# Patient Record
Sex: FEMALE | Race: BLACK OR AFRICAN AMERICAN | Employment: FULL TIME | URBAN - METROPOLITAN AREA
[De-identification: names, ages, dates, MRNs, and addresses within clinical notes are randomized per-mention and may not be internally consistent; named-entity substitution may affect disease eponyms.]

---

## 2017-01-16 DIAGNOSIS — E55.9 VITAMIN D DEFICIENCY: Primary | ICD-10-CM

## 2017-01-16 RX ORDER — ERGOCALCIFEROL 1.25 MG/1
50000 CAPSULE ORAL WEEKLY
Qty: 8 CAPSULE | Refills: 0 | Status: SHIPPED | OUTPATIENT
Start: 2017-01-16 | End: 2017-02-15

## 2017-01-31 PROBLEM — F41.1 ANXIETY STATE: Status: ACTIVE | Noted: 2017-01-31

## 2017-01-31 PROBLEM — F32.A DEPRESSION: Status: ACTIVE | Noted: 2017-01-31

## 2017-04-14 RX ORDER — ALBUTEROL SULFATE 90 UG/1
1 AEROSOL, METERED RESPIRATORY (INHALATION) EVERY 6 HOURS PRN
Qty: 1 INHALER | Refills: 0 | Status: SHIPPED | OUTPATIENT
Start: 2017-04-14 | End: 2017-06-03

## 2017-04-14 NOTE — TELEPHONE ENCOUNTER
Luis Brown needs a refill of:    •  Albuterol Sulfate HFA (PROAIR HFA) 108 (90 BASE) MCG/ACT Inhalation Aero Soln, Inhale  into the lungs. , Disp: , Rfl:

## 2017-04-14 NOTE — TELEPHONE ENCOUNTER
Requesting Albuterol inhaler refill. Reviewed request with pt. Pt states she has had to use her inhaler a few times a week since the weather change, uses it mostly in the morning.  States she is not in any respiratory distress at this time but would like

## 2017-06-04 NOTE — TELEPHONE ENCOUNTER
Refilled per protocol  Refill Protocol Appointment Criteria  · Appointment scheduled in the past 6 months or in the next 3 months  Recent Visits       Provider Department Primary Dx    5 months ago Patricia Dumont, [facility]

## 2017-06-16 ENCOUNTER — TELEPHONE (OUTPATIENT)
Dept: FAMILY MEDICINE CLINIC | Facility: CLINIC | Age: 37
End: 2017-06-16

## 2017-06-16 NOTE — TELEPHONE ENCOUNTER
Actions Requested:  Pt called office and states \"I think I have vertigo, and been feeling dizzy for about a month, but getting worse\". Describes dizziness as intermittent, sometimes she fells off balance and at times like she is going to faint.   She has Heart beating < 50 beats per minute OR > 140 beats per minute  * Sounds like a life-threatening emergency to the triager  Patient provided with clinic contact information, hours of operation and will call back if needed.  Patient was able to restate instruc

## 2017-06-19 NOTE — TELEPHONE ENCOUNTER
Appt scheduled Saturday 6/24/17 as requested. Advised pt to call back or proceed to UC if symptoms worsen prior to appt, pt agreed with advice given.

## 2017-06-19 NOTE — TELEPHONE ENCOUNTER
Pt states she did not go to UC on Friday as her symptom improved. Pt not able to schedule appt, asked if she could schedule appt with Dr. Nicole Cruz on Saturday, please advise.

## 2017-06-24 ENCOUNTER — APPOINTMENT (OUTPATIENT)
Dept: LAB | Age: 37
End: 2017-06-24
Attending: FAMILY MEDICINE
Payer: COMMERCIAL

## 2017-06-24 ENCOUNTER — OFFICE VISIT (OUTPATIENT)
Dept: FAMILY MEDICINE CLINIC | Facility: CLINIC | Age: 37
End: 2017-06-24

## 2017-06-24 VITALS
TEMPERATURE: 97 F | SYSTOLIC BLOOD PRESSURE: 111 MMHG | DIASTOLIC BLOOD PRESSURE: 74 MMHG | WEIGHT: 194 LBS | BODY MASS INDEX: 33 KG/M2 | HEART RATE: 93 BPM

## 2017-06-24 DIAGNOSIS — E55.9 VITAMIN D DEFICIENCY: ICD-10-CM

## 2017-06-24 DIAGNOSIS — R42 DIZZY SPELLS: ICD-10-CM

## 2017-06-24 DIAGNOSIS — R42 DIZZY SPELLS: Primary | ICD-10-CM

## 2017-06-24 PROCEDURE — 82306 VITAMIN D 25 HYDROXY: CPT

## 2017-06-24 PROCEDURE — 93005 ELECTROCARDIOGRAM TRACING: CPT | Performed by: FAMILY MEDICINE

## 2017-06-24 PROCEDURE — 99214 OFFICE O/P EST MOD 30 MIN: CPT | Performed by: FAMILY MEDICINE

## 2017-06-24 PROCEDURE — 93010 ELECTROCARDIOGRAM REPORT: CPT | Performed by: FAMILY MEDICINE

## 2017-06-24 PROCEDURE — 99212 OFFICE O/P EST SF 10 MIN: CPT | Performed by: FAMILY MEDICINE

## 2017-06-24 PROCEDURE — 36415 COLL VENOUS BLD VENIPUNCTURE: CPT

## 2017-06-24 PROCEDURE — 80053 COMPREHEN METABOLIC PANEL: CPT

## 2017-06-24 PROCEDURE — 84443 ASSAY THYROID STIM HORMONE: CPT

## 2017-06-24 PROCEDURE — 85027 COMPLETE CBC AUTOMATED: CPT

## 2017-06-24 NOTE — PROGRESS NOTES
HPI: Tete Lazaro is a 39year old female who presents for \"dizzy spells\" over the past month. Pt reports it happens randomly. She will be standing and having a conversation and then feels \"out of focus\". States she gets blurry vision.   Lasts a few second reflexes. Normal gait. Normal heel to toe. Negative rhomberg. No pronator drift        Assessment:/Plan:  Dizzy spells  (primary encounter diagnosis)    Normal EKG. Will check baseline labwork.   Encouraged pt to increase fluids as she does not drink that

## 2018-01-23 ENCOUNTER — TELEPHONE (OUTPATIENT)
Dept: OTHER | Age: 38
End: 2018-01-23

## 2018-01-23 RX ORDER — OSELTAMIVIR PHOSPHATE 75 MG/1
75 CAPSULE ORAL 2 TIMES DAILY
Qty: 10 CAPSULE | Refills: 0 | Status: SHIPPED | OUTPATIENT
Start: 2018-01-23 | End: 2018-01-28

## 2018-01-23 NOTE — TELEPHONE ENCOUNTER
Pt states her children were seen in office yesterday and placed on Tamiflu. Pt was advised to call back if she started having symptoms and doctor would send prescription for her. Pt states she has a lingering headache, achiness, and cough.  No fever, no jamari

## 2018-09-06 ENCOUNTER — OFFICE VISIT (OUTPATIENT)
Dept: FAMILY MEDICINE CLINIC | Facility: CLINIC | Age: 38
End: 2018-09-06
Payer: COMMERCIAL

## 2018-09-06 VITALS
HEART RATE: 85 BPM | RESPIRATION RATE: 16 BRPM | TEMPERATURE: 98 F | DIASTOLIC BLOOD PRESSURE: 75 MMHG | SYSTOLIC BLOOD PRESSURE: 113 MMHG | WEIGHT: 193 LBS | BODY MASS INDEX: 33 KG/M2

## 2018-09-06 DIAGNOSIS — J01.01 ACUTE RECURRENT MAXILLARY SINUSITIS: Primary | ICD-10-CM

## 2018-09-06 PROCEDURE — 99213 OFFICE O/P EST LOW 20 MIN: CPT | Performed by: FAMILY MEDICINE

## 2018-09-06 PROCEDURE — 99212 OFFICE O/P EST SF 10 MIN: CPT | Performed by: FAMILY MEDICINE

## 2018-09-06 RX ORDER — AMOXICILLIN AND CLAVULANATE POTASSIUM 875; 125 MG/1; MG/1
1 TABLET, FILM COATED ORAL 2 TIMES DAILY
Qty: 20 TABLET | Refills: 0 | Status: SHIPPED | OUTPATIENT
Start: 2018-09-06 | End: 2018-09-16

## 2018-09-06 NOTE — PROGRESS NOTES
HPI: Robert Samples is a 45year old female who presents for nasal congestion, pressure in sinus. Has headache as well. Taking Sudafed PE with mild relief. Has been 4 days. Had fever of 101 on Sunday. Had sore throat but improved. Ears are irritated.  No co

## 2018-09-15 ENCOUNTER — NURSE TRIAGE (OUTPATIENT)
Dept: OTHER | Age: 38
End: 2018-09-15

## 2018-09-15 RX ORDER — FLUCONAZOLE 150 MG/1
150 TABLET ORAL ONCE
Qty: 1 TABLET | Refills: 0 | Status: SHIPPED | OUTPATIENT
Start: 2018-09-15 | End: 2018-09-15

## 2018-09-15 NOTE — TELEPHONE ENCOUNTER
Please reply to pool: EM RN TRIAGE    Action Requested: Summary for Provider     []  Critical Lab, Recommendations Needed  [x] Need Additional Advice  []   FYI    [x]   Need Orders  [x] Need Medications Sent to Pharmacy  []  Other     Geno Carpio

## 2018-09-15 NOTE — TELEPHONE ENCOUNTER
Verified name and . Informed pt doctor sent Diflucan prescription to pharmacy and advised pt to call back if no improvement in symptoms a few days after taking med, pt agreed with plan of care and had no further questions at this time.

## 2018-10-29 NOTE — TELEPHONE ENCOUNTER
Requesting Albuterol refill, last filled 6/4/17    Refill Protocol Appointment Criteria  · Appointment scheduled in the past 6 months or in the next 3 months  Recent Outpatient Visits            1 month ago Acute recurrent maxillary sinusitis    Rolando SALEEM

## 2018-12-12 RX ORDER — ALBUTEROL SULFATE 90 UG/1
2 AEROSOL, METERED RESPIRATORY (INHALATION) EVERY 4 HOURS PRN
Qty: 1 INHALER | Refills: 3 | Status: SHIPPED | OUTPATIENT
Start: 2018-12-12 | End: 2019-09-09

## 2018-12-12 RX ORDER — ALBUTEROL SULFATE 90 UG/1
AEROSOL, METERED RESPIRATORY (INHALATION)
Qty: 18 G | Refills: 0 | Status: SHIPPED | OUTPATIENT
Start: 2018-12-12 | End: 2019-09-10

## 2018-12-12 NOTE — TELEPHONE ENCOUNTER
Dr Jessica Linares, pt is requesting refills on Ventolin inhaler (pt came in for her son's appt this morning).  Pt is out of med

## 2019-09-11 ENCOUNTER — PATIENT MESSAGE (OUTPATIENT)
Dept: OTHER | Age: 39
End: 2019-09-11

## 2019-09-11 NOTE — TELEPHONE ENCOUNTER
Failed protocol but last filled in 12/2018. LMTCB to triage and also the following [employer] message sent. Please review refill request.  Thank you.      Refill Protocol Appointment Criteria  · Appointment scheduled in the past 6 months or in the next 3 mon

## 2019-09-11 NOTE — TELEPHONE ENCOUNTER
Patient stated that the last 1 month has been using her albuterol inhaler 1-2 times daily. Patient thinks allergies, especially when the pollen count is high. Patient not currently short of breath or having any symptoms.  Wanted to make sure had inhaler in

## 2019-09-12 RX ORDER — ALBUTEROL SULFATE 90 UG/1
AEROSOL, METERED RESPIRATORY (INHALATION)
Qty: 18 G | Refills: 0 | Status: SHIPPED | OUTPATIENT
Start: 2019-09-12 | End: 2019-10-29

## 2019-09-12 NOTE — TELEPHONE ENCOUNTER
Please review; protocol failed.     Requested Prescriptions     Pending Prescriptions Disp Refills   • VENTOLIN  (90 Base) MCG/ACT Inhalation Aero Soln [Pharmacy Med Name: VENTOLIN HFA INH W/DOS CTR 200PUFFS] 18 g 0     Sig: INHALE 1 PUFF INTO THE TIAN

## 2019-10-29 ENCOUNTER — OFFICE VISIT (OUTPATIENT)
Dept: FAMILY MEDICINE CLINIC | Facility: CLINIC | Age: 39
End: 2019-10-29
Payer: COMMERCIAL

## 2019-10-29 ENCOUNTER — LAB ENCOUNTER (OUTPATIENT)
Dept: LAB | Age: 39
End: 2019-10-29
Attending: FAMILY MEDICINE
Payer: COMMERCIAL

## 2019-10-29 VITALS
SYSTOLIC BLOOD PRESSURE: 109 MMHG | DIASTOLIC BLOOD PRESSURE: 70 MMHG | BODY MASS INDEX: 33.25 KG/M2 | HEIGHT: 64.57 IN | WEIGHT: 197.13 LBS | HEART RATE: 72 BPM | TEMPERATURE: 98 F

## 2019-10-29 DIAGNOSIS — Z56.89 WORK-RELATED CONDITION: ICD-10-CM

## 2019-10-29 DIAGNOSIS — J45.20 MILD INTERMITTENT ASTHMA WITHOUT COMPLICATION: ICD-10-CM

## 2019-10-29 DIAGNOSIS — Z01.84 IMMUNITY STATUS TESTING: Primary | ICD-10-CM

## 2019-10-29 DIAGNOSIS — Z01.84 IMMUNITY STATUS TESTING: ICD-10-CM

## 2019-10-29 PROCEDURE — 86706 HEP B SURFACE ANTIBODY: CPT

## 2019-10-29 PROCEDURE — 90714 TD VACC NO PRESV 7 YRS+ IM: CPT | Performed by: FAMILY MEDICINE

## 2019-10-29 PROCEDURE — 86580 TB INTRADERMAL TEST: CPT | Performed by: FAMILY MEDICINE

## 2019-10-29 PROCEDURE — 36415 COLL VENOUS BLD VENIPUNCTURE: CPT

## 2019-10-29 PROCEDURE — 86762 RUBELLA ANTIBODY: CPT

## 2019-10-29 PROCEDURE — 90472 IMMUNIZATION ADMIN EACH ADD: CPT | Performed by: FAMILY MEDICINE

## 2019-10-29 PROCEDURE — 90471 IMMUNIZATION ADMIN: CPT | Performed by: FAMILY MEDICINE

## 2019-10-29 PROCEDURE — 86787 VARICELLA-ZOSTER ANTIBODY: CPT

## 2019-10-29 PROCEDURE — 87340 HEPATITIS B SURFACE AG IA: CPT

## 2019-10-29 PROCEDURE — 86765 RUBEOLA ANTIBODY: CPT

## 2019-10-29 PROCEDURE — 99214 OFFICE O/P EST MOD 30 MIN: CPT | Performed by: FAMILY MEDICINE

## 2019-10-29 PROCEDURE — 86735 MUMPS ANTIBODY: CPT

## 2019-10-29 PROCEDURE — 80307 DRUG TEST PRSMV CHEM ANLYZR: CPT

## 2019-10-29 PROCEDURE — 90686 IIV4 VACC NO PRSV 0.5 ML IM: CPT | Performed by: FAMILY MEDICINE

## 2019-10-29 RX ORDER — ALBUTEROL SULFATE 90 UG/1
AEROSOL, METERED RESPIRATORY (INHALATION)
Qty: 1 INHALER | Refills: 3 | Status: SHIPPED | OUTPATIENT
Start: 2019-10-29 | End: 2020-03-09

## 2019-10-29 NOTE — PROGRESS NOTES
HPI: Rashaun Baez is a 44year old female who presents for work requirements. Starting new assignment at [employer] as . Needs TB, titers and Hep B. Needs TdaP and Flu. Has been using inhaler more often.   Feels like it is [place]

## 2019-10-31 ENCOUNTER — NURSE ONLY (OUTPATIENT)
Dept: FAMILY MEDICINE CLINIC | Facility: CLINIC | Age: 39
End: 2019-10-31
Payer: COMMERCIAL

## 2019-10-31 DIAGNOSIS — Z11.1 ENCOUNTER FOR TB TINE TEST: Primary | ICD-10-CM

## 2019-11-05 ENCOUNTER — TELEPHONE (OUTPATIENT)
Dept: FAMILY MEDICINE CLINIC | Facility: CLINIC | Age: 39
End: 2019-11-05

## 2019-11-05 NOTE — TELEPHONE ENCOUNTER
Patient following up, requesting if results for her drug panel, completed 10/29 can be released to her [employer] to print out. Followed up with Epic, results have to be released by provider only, will not automatically release.  Please confirm if you are ab

## 2019-11-06 NOTE — TELEPHONE ENCOUNTER
Release this. I reopened the lab results and cannot find a way to release it. It does not even pop up to be released. It may be confidential information and unable to be released. Could be fax a copy for her somewhere.   We could also put a hard copy in

## 2019-11-07 NOTE — TELEPHONE ENCOUNTER
Pt advised copy of labs ready for p/u at [facility]. She will come in today for a nurse visit and will  the labs.

## 2019-11-08 ENCOUNTER — NURSE ONLY (OUTPATIENT)
Dept: FAMILY MEDICINE CLINIC | Facility: CLINIC | Age: 39
End: 2019-11-08
Payer: COMMERCIAL

## 2019-11-08 DIAGNOSIS — Z11.1 ENCOUNTER FOR TB TINE TEST: Primary | ICD-10-CM

## 2019-11-08 PROCEDURE — 86580 TB INTRADERMAL TEST: CPT | Performed by: FAMILY MEDICINE

## 2019-11-11 ENCOUNTER — NURSE ONLY (OUTPATIENT)
Dept: FAMILY MEDICINE CLINIC | Facility: CLINIC | Age: 39
End: 2019-11-11
Payer: COMMERCIAL

## 2019-11-11 NOTE — PROGRESS NOTES
Pt presented for TB read. TB placed on 11/8/19 on Right forearm. TB read today 11/11/19 which was negative. Letter generated for pt as requested. No further action required.

## 2020-01-03 ENCOUNTER — LAB ENCOUNTER (OUTPATIENT)
Dept: LAB | Age: 40
End: 2020-01-03
Attending: FAMILY MEDICINE
Payer: COMMERCIAL

## 2020-01-03 ENCOUNTER — TELEPHONE (OUTPATIENT)
Dept: FAMILY MEDICINE CLINIC | Facility: CLINIC | Age: 40
End: 2020-01-03

## 2020-01-03 DIAGNOSIS — Z56.89 WORK-RELATED CONDITION: ICD-10-CM

## 2020-01-03 DIAGNOSIS — Z01.84 IMMUNITY STATUS TESTING: ICD-10-CM

## 2020-01-03 DIAGNOSIS — Z56.89 WORK-RELATED CONDITION: Primary | ICD-10-CM

## 2020-01-03 LAB
AMPHET UR QL SCN: NEGATIVE
BARBITURATES UR QL SCN: NEGATIVE
BENZODIAZ UR QL SCN: NEGATIVE
CANNABINOIDS UR QL SCN: NEGATIVE
COCAINE UR QL: NEGATIVE
MDMA UR QL SCN: NEGATIVE
METHADONE UR QL SCN: NEGATIVE
OPIATES UR QL SCN: NEGATIVE
OXYCODONE UR QL SCN: NEGATIVE
PCP UR QL SCN: NEGATIVE

## 2020-01-03 PROCEDURE — 36415 COLL VENOUS BLD VENIPUNCTURE: CPT

## 2020-01-03 PROCEDURE — 86765 RUBEOLA ANTIBODY: CPT

## 2020-01-03 PROCEDURE — 80307 DRUG TEST PRSMV CHEM ANLYZR: CPT

## 2020-01-03 NOTE — TELEPHONE ENCOUNTER
Patient called in stating that she needs to do a drug screening for her employer. She is requesting to have these done as soon as possible. Also, asking if she can have her MMR titer re-drawn. She is passed the 4-6 week waiting period.      Please a

## 2020-01-06 LAB — MEV IGG SER-ACNC: 9.4 AU/ML (ref 16.5–?)

## 2020-01-10 ENCOUNTER — TELEPHONE (OUTPATIENT)
Dept: FAMILY MEDICINE CLINIC | Facility: CLINIC | Age: 40
End: 2020-01-10

## 2020-01-10 NOTE — TELEPHONE ENCOUNTER
She does need another booster. She still is not immune. She can schedule RN visit.   We will print results on MOnday and sign

## 2020-01-10 NOTE — TELEPHONE ENCOUNTER
Pt informed of Dr Laurita Pastrana message below. Pt voiced understanding and agrees. She will wait for Dr Karishma Brown next week to schedule MMR.

## 2020-01-10 NOTE — TELEPHONE ENCOUNTER
Dr. Humble Lanier will be back in the office on Monday to sign these results. Looks like she may need another MMR booster as well.

## 2020-01-10 NOTE — TELEPHONE ENCOUNTER
Please review Rubeola titer results. Patient had MMR on 11/7/19. Please advise. Patient also needs drug screening results printed and signed by doctor for patient to pickup.

## 2020-01-11 NOTE — TELEPHONE ENCOUNTER
Spoke with patient (verified name and ), advised Dr Quinteros Meth note and verbalized understanding.  States that someone already called her few minutes ago and told her to come in today, while checking the chart, this nurse cannot find any RN appointment ass

## 2020-01-13 ENCOUNTER — NURSE ONLY (OUTPATIENT)
Dept: FAMILY MEDICINE CLINIC | Facility: CLINIC | Age: 40
End: 2020-01-13
Payer: COMMERCIAL

## 2020-01-13 DIAGNOSIS — Z23 NEED FOR MMR VACCINE: Primary | ICD-10-CM

## 2020-01-13 PROCEDURE — 90707 MMR VACCINE SC: CPT | Performed by: FAMILY MEDICINE

## 2020-01-13 PROCEDURE — 90471 IMMUNIZATION ADMIN: CPT | Performed by: FAMILY MEDICINE

## 2020-02-26 ENCOUNTER — OFFICE VISIT (OUTPATIENT)
Dept: FAMILY MEDICINE CLINIC | Facility: CLINIC | Age: 40
End: 2020-02-26
Payer: COMMERCIAL

## 2020-02-26 VITALS
BODY MASS INDEX: 33 KG/M2 | WEIGHT: 198 LBS | HEART RATE: 66 BPM | DIASTOLIC BLOOD PRESSURE: 72 MMHG | TEMPERATURE: 98 F | SYSTOLIC BLOOD PRESSURE: 113 MMHG | RESPIRATION RATE: 16 BRPM

## 2020-02-26 DIAGNOSIS — Z12.39 SCREENING FOR BREAST CANCER: ICD-10-CM

## 2020-02-26 DIAGNOSIS — Z00.00 ROUTINE PHYSICAL EXAMINATION: Primary | ICD-10-CM

## 2020-02-26 DIAGNOSIS — R06.83 SNORING: ICD-10-CM

## 2020-02-26 DIAGNOSIS — T83.9XXA COMPLICATION OF INTRAUTERINE DEVICE (IUD), UNSPECIFIED COMPLICATION, INITIAL ENCOUNTER (HCC): ICD-10-CM

## 2020-02-26 PROCEDURE — 99395 PREV VISIT EST AGE 18-39: CPT | Performed by: FAMILY MEDICINE

## 2020-02-27 NOTE — PROGRESS NOTES
HPI: Aldair Boyce is a 44year old female who presents for replacement of Mirena. Gets monthly spotting. Never had abnormal paps. Last Mirena placed in 2014. Has 2 boys.  got vasectomy. Last pap in 2016. HPV negative. Due in 2021.  Working on [place] external genitalia. Normal vagina  Breasts:  Normal appearance bilateral.  No masses or lesions noted. Normal nipples bilateral.  No nipple discharge noted. Normal lymph nodes. Extremities: No cyanosis, clubbing, edema. Pedal pulses 2+ windy.   MSK:  No

## 2020-03-11 RX ORDER — ALBUTEROL SULFATE 90 UG/1
AEROSOL, METERED RESPIRATORY (INHALATION)
Qty: 1 INHALER | Refills: 3 | Status: SHIPPED | OUTPATIENT
Start: 2020-03-11 | End: 2021-05-27

## 2020-03-11 NOTE — TELEPHONE ENCOUNTER
Albuterol Sulfate HFA (VENTOLIN HFA) 108 (90 Base) MCG/ACT Inhalation Aero Soln 1 Inhaler 3 10/29/2019    Sig:   INHALE 1 PUFF INTO THE LUNGS EVERY 6 HOURS AS NEEDED FOR WHEEZING       Please advise on refill. Thanks.     Patient Comment: I picked up this p

## 2020-03-20 ENCOUNTER — TELEPHONE (OUTPATIENT)
Dept: OBGYN CLINIC | Facility: CLINIC | Age: 40
End: 2020-03-20

## 2020-03-20 NOTE — TELEPHONE ENCOUNTER
Cherelle kat pt for IUD consult:     Pt scheduled this apt to have Mirena IUD removed and possibly replaced with a new one. Has had Mirena for 5 years. Pt was referred by Dr. Tahir Menon.  Pls advise

## 2020-03-23 ENCOUNTER — OFFICE VISIT (OUTPATIENT)
Dept: OBGYN CLINIC | Facility: CLINIC | Age: 40
End: 2020-03-23
Payer: COMMERCIAL

## 2020-03-23 VITALS — WEIGHT: 196.81 LBS | DIASTOLIC BLOOD PRESSURE: 72 MMHG | SYSTOLIC BLOOD PRESSURE: 110 MMHG | BODY MASS INDEX: 33 KG/M2

## 2020-03-23 DIAGNOSIS — Z30.432 ENCOUNTER FOR REMOVAL OF INTRAUTERINE CONTRACEPTIVE DEVICE: Primary | ICD-10-CM

## 2020-03-23 PROCEDURE — 58301 REMOVE INTRAUTERINE DEVICE: CPT | Performed by: OBSTETRICS & GYNECOLOGY

## 2020-03-23 NOTE — PROCEDURES
IUD Removal     Pregnancy Results: negative from urine test   Birth control method(s) used:Mirena  ; date last used:    Consent signed. Procedure discussed with the patient in detail including indication, risks, benefits, alternatives and complications.

## 2020-04-04 ENCOUNTER — NURSE TRIAGE (OUTPATIENT)
Dept: FAMILY MEDICINE CLINIC | Facility: CLINIC | Age: 40
End: 2020-04-04

## 2020-04-04 RX ORDER — NITROFURANTOIN 25; 75 MG/1; MG/1
100 CAPSULE ORAL 2 TIMES DAILY
Qty: 14 CAPSULE | Refills: 0 | Status: SHIPPED | OUTPATIENT
Start: 2020-04-04 | End: 2020-04-11

## 2020-04-04 NOTE — TELEPHONE ENCOUNTER
Please reply to pool: EM RN TRIAGE    Action Requested: Summary for Provider     []  Critical Lab, Recommendations Needed  [x] Need Additional Advice  []   FYI    [x]   Need Orders  [x] Need Medications Sent to Pharmacy  []  Other     SUMMARY: Requesting a

## 2020-08-24 NOTE — TELEPHONE ENCOUNTER
Patient Covid 19 test done 8/20/2020 and result negative. Patient states self quarantine, reports no signs or symptoms. Mepilex applied to sacrum. Patient calling in for results on MMR titer from 1/3, ordered by Dr. Andrés Samuel. Please advise.

## 2020-10-28 ENCOUNTER — TELEPHONE (OUTPATIENT)
Dept: FAMILY MEDICINE CLINIC | Facility: CLINIC | Age: 40
End: 2020-10-28

## 2020-10-28 DIAGNOSIS — Z11.1 SCREENING FOR TUBERCULOSIS: Primary | ICD-10-CM

## 2020-10-28 NOTE — TELEPHONE ENCOUNTER
Patient is requesting a Quantiferon TB blood test. Patient is required to complete test for work. Please advise.

## 2020-11-04 ENCOUNTER — IMMUNIZATION (OUTPATIENT)
Dept: FAMILY MEDICINE CLINIC | Facility: CLINIC | Age: 40
End: 2020-11-04
Payer: COMMERCIAL

## 2020-11-04 DIAGNOSIS — Z23 NEED FOR VACCINATION: ICD-10-CM

## 2020-11-04 PROCEDURE — 90471 IMMUNIZATION ADMIN: CPT | Performed by: FAMILY MEDICINE

## 2020-11-04 PROCEDURE — 90686 IIV4 VACC NO PRSV 0.5 ML IM: CPT | Performed by: FAMILY MEDICINE

## 2020-11-11 ENCOUNTER — LAB ENCOUNTER (OUTPATIENT)
Dept: LAB | Age: 40
End: 2020-11-11
Attending: FAMILY MEDICINE
Payer: COMMERCIAL

## 2020-11-11 ENCOUNTER — HOSPITAL ENCOUNTER (OUTPATIENT)
Dept: MAMMOGRAPHY | Age: 40
Discharge: HOME | End: 2020-11-11
Attending: FAMILY MEDICINE
Payer: COMMERCIAL

## 2020-11-11 DIAGNOSIS — Z12.39 SCREENING FOR BREAST CANCER: ICD-10-CM

## 2020-11-11 DIAGNOSIS — Z00.00 ROUTINE PHYSICAL EXAMINATION: ICD-10-CM

## 2020-11-11 DIAGNOSIS — Z11.1 SCREENING FOR TUBERCULOSIS: ICD-10-CM

## 2020-11-11 PROCEDURE — 36415 COLL VENOUS BLD VENIPUNCTURE: CPT

## 2020-11-11 PROCEDURE — 83036 HEMOGLOBIN GLYCOSYLATED A1C: CPT

## 2020-11-11 PROCEDURE — 80053 COMPREHEN METABOLIC PANEL: CPT

## 2020-11-11 PROCEDURE — 77067 SCR MAMMO BI INCL CAD: CPT | Performed by: FAMILY MEDICINE

## 2020-11-11 PROCEDURE — 77063 BREAST TOMOSYNTHESIS BI: CPT | Performed by: FAMILY MEDICINE

## 2020-11-11 PROCEDURE — 82306 VITAMIN D 25 HYDROXY: CPT

## 2020-11-11 PROCEDURE — 80061 LIPID PANEL: CPT

## 2020-11-11 PROCEDURE — 86480 TB TEST CELL IMMUN MEASURE: CPT

## 2020-11-11 PROCEDURE — 84443 ASSAY THYROID STIM HORMONE: CPT

## 2020-11-11 PROCEDURE — 85027 COMPLETE CBC AUTOMATED: CPT

## 2020-11-22 DIAGNOSIS — E55.9 VITAMIN D DEFICIENCY: Primary | ICD-10-CM

## 2020-11-22 RX ORDER — ERGOCALCIFEROL 1.25 MG/1
50000 CAPSULE ORAL WEEKLY
Qty: 8 CAPSULE | Refills: 0 | Status: SHIPPED | OUTPATIENT
Start: 2020-11-22 | End: 2020-12-22

## 2021-02-06 RX ORDER — ERGOCALCIFEROL 1.25 MG/1
CAPSULE ORAL
Qty: 8 CAPSULE | Refills: 0 | OUTPATIENT
Start: 2021-02-06

## 2021-03-13 DIAGNOSIS — Z23 NEED FOR VACCINATION: ICD-10-CM

## 2021-04-10 ENCOUNTER — IMMUNIZATION (OUTPATIENT)
Dept: LAB | Age: 41
End: 2021-04-10
Attending: HOSPITALIST
Payer: COMMERCIAL

## 2021-04-10 DIAGNOSIS — Z23 NEED FOR VACCINATION: Primary | ICD-10-CM

## 2021-04-10 PROCEDURE — 0001A SARSCOV2 VAC 30MCG/0.3ML IM: CPT

## 2021-05-01 ENCOUNTER — IMMUNIZATION (OUTPATIENT)
Dept: LAB | Age: 41
End: 2021-05-01
Attending: HOSPITALIST
Payer: COMMERCIAL

## 2021-05-01 DIAGNOSIS — Z23 NEED FOR VACCINATION: Primary | ICD-10-CM

## 2021-05-01 PROCEDURE — 0002A SARSCOV2 VAC 30MCG/0.3ML IM: CPT

## 2021-05-27 RX ORDER — ALBUTEROL SULFATE 90 UG/1
AEROSOL, METERED RESPIRATORY (INHALATION)
Qty: 8.5 G | Refills: 0 | Status: SHIPPED | OUTPATIENT
Start: 2021-05-27

## 2021-07-12 ENCOUNTER — HOSPITAL ENCOUNTER (OUTPATIENT)
Age: 41
Discharge: HOME | End: 2021-07-12
Payer: COMMERCIAL

## 2021-07-12 VITALS
OXYGEN SATURATION: 100 % | HEART RATE: 91 BPM | TEMPERATURE: 97 F | RESPIRATION RATE: 18 BRPM | SYSTOLIC BLOOD PRESSURE: 124 MMHG | DIASTOLIC BLOOD PRESSURE: 71 MMHG

## 2021-07-12 DIAGNOSIS — J01.40 ACUTE NON-RECURRENT PANSINUSITIS: Primary | ICD-10-CM

## 2021-07-12 PROCEDURE — 99203 OFFICE O/P NEW LOW 30 MIN: CPT | Performed by: NURSE PRACTITIONER

## 2021-07-12 RX ORDER — FLUTICASONE PROPIONATE 50 MCG
2 SPRAY, SUSPENSION (ML) NASAL DAILY
Qty: 16 G | Refills: 0 | Status: SHIPPED | OUTPATIENT
Start: 2021-07-12 | End: 2021-08-06

## 2021-07-12 RX ORDER — METHYLPREDNISOLONE 4 MG/1
TABLET ORAL
Qty: 1 EACH | Refills: 0 | Status: SHIPPED | OUTPATIENT
Start: 2021-07-12 | End: 2021-08-06

## 2021-07-12 RX ORDER — AMOXICILLIN AND CLAVULANATE POTASSIUM 875; 125 MG/1; MG/1
1 TABLET, FILM COATED ORAL 2 TIMES DAILY
Qty: 20 TABLET | Refills: 0 | Status: SHIPPED | OUTPATIENT
Start: 2021-07-12 | End: 2021-07-22

## 2021-07-13 NOTE — ED PROVIDER NOTES
Patient Seen in: [facility]      History   Patient presents with:  Sinus Problem    Stated Complaint: congestion    HPI/Subjective:   HPI    This is a well-appearing 25-year-old female with a history of asthma who is presents with a chief co ear normal.      Left Ear: Tympanic membrane, ear canal and external ear normal.      Nose: Mucosal edema and rhinorrhea present. Rhinorrhea is purulent. Right Sinus: Maxillary sinus tenderness and frontal sinus tenderness present. Left Sinus:  Malathi Moffett Examination symptoms consistent with sinusitis. I will prescribe Augmentin as well as Flonase and Medrol Dosepak all of which she has tolerated in the past.  We also discussed supportive care and close follow-up.   I did discuss with her also with the anti

## 2021-07-13 NOTE — ED INITIAL ASSESSMENT (HPI)
Pt states having sinus congestion, and post nasal drip. Pt states also feeling like her ear are full of fluid. Pt states symptoms began about 5 days ago. Pt denies fever or headache.

## 2021-08-05 RX ORDER — ALBUTEROL SULFATE 90 UG/1
AEROSOL, METERED RESPIRATORY (INHALATION)
Qty: 8.5 G | Refills: 0 | OUTPATIENT
Start: 2021-08-05

## 2021-08-05 RX ORDER — ALBUTEROL SULFATE 90 UG/1
1 AEROSOL, METERED RESPIRATORY (INHALATION) EVERY 6 HOURS PRN
Qty: 18 G | Refills: 0 | Status: SHIPPED | OUTPATIENT
Start: 2021-08-05 | End: 2021-08-06

## 2021-08-05 NOTE — TELEPHONE ENCOUNTER
Sent mychart informing pt she needs an office visit with Dr Freddie Green, [address] [place] [address] 2/26/20.

## 2021-08-05 NOTE — TELEPHONE ENCOUNTER
Please review; protocol failed/no protocol    Requested Prescriptions   Pending Prescriptions Disp Refills    VENTOLIN  (90 Base) MCG/ACT Inhalation Aero Soln [Pharmacy Med Name: VENTOLIN HFA INH W/DOS CTR 200PUFFS] 18 g 3     Sig: INHALE 1 PUFF INTO THE LUNGS EVERY 6 HOURS AS NEEDED FOR WHEEZING        Asthma & COPD Medication Protocol Failed - 8/5/2021 10:24 AM        Failed - Appointment in past 6 or next 3 months           ALBUTEROL SULFATE  (90 Base) MCG/ACT Inhalation Aero Soln [Pharmacy Med Name: ALBUTEROL HFA INH (200 PUFFS)8.5GM] 8.5 g 0     Sig: INHALE 1 PUFF INTO THE LUNGS EVERY 6 HOURS AS NEEDED FOR WHEEZING        Asthma & COPD Medication Protocol Failed - 8/5/2021 10:24 AM        Failed - Appointment in past 6 or next 3 months              Recent Outpatient Visits              1 year ago Encounter for removal of intrauterine contraceptive device    [facility], [address] [place] [address], Rosalie Hernandez MD    Office Visit    1 year ago Routine physical examination    [facility], [address] [place], [place]    Office Visit    1 year ago Need for MMR vaccine    [facility] [address]    Nurse Only    1 year ago     [facility] [address]    Nurse Only    1 year ago Encounter for TB lev test    [facility] [address]    Nurse Only

## 2021-08-06 ENCOUNTER — OFFICE VISIT (OUTPATIENT)
Dept: FAMILY MEDICINE CLINIC | Facility: CLINIC | Age: 41
End: 2021-08-06
Payer: COMMERCIAL

## 2021-08-06 VITALS
BODY MASS INDEX: 33.7 KG/M2 | HEIGHT: 65 IN | WEIGHT: 202.25 LBS | TEMPERATURE: 98 F | DIASTOLIC BLOOD PRESSURE: 80 MMHG | SYSTOLIC BLOOD PRESSURE: 115 MMHG | HEART RATE: 67 BPM

## 2021-08-06 DIAGNOSIS — Z12.4 PAP SMEAR FOR CERVICAL CANCER SCREENING: ICD-10-CM

## 2021-08-06 DIAGNOSIS — J45.20 MILD INTERMITTENT ASTHMA WITHOUT COMPLICATION: Primary | ICD-10-CM

## 2021-08-06 DIAGNOSIS — Z12.31 BREAST CANCER SCREENING BY MAMMOGRAM: ICD-10-CM

## 2021-08-06 PROCEDURE — 3008F BODY MASS INDEX DOCD: CPT | Performed by: FAMILY MEDICINE

## 2021-08-06 PROCEDURE — 3079F DIAST BP 80-89 MM HG: CPT | Performed by: FAMILY MEDICINE

## 2021-08-06 PROCEDURE — 3074F SYST BP LT 130 MM HG: CPT | Performed by: FAMILY MEDICINE

## 2021-08-06 PROCEDURE — 99214 OFFICE O/P EST MOD 30 MIN: CPT | Performed by: FAMILY MEDICINE

## 2021-08-06 NOTE — PROGRESS NOTES
HPI:    Roseline Estrada is a 36year old female presents to clinic for follow-up regarding asthma. Uses inhaler rarely-typically more frequently in the spring/summer. Less than 2 times per week, denies night waking with symptoms.   Feels her conditio Breath sounds: Normal breath sounds. No stridor. No wheezing or rhonchi. Genitourinary:     General: Normal vulva. Vagina: Normal.      Cervix: Normal.   Neurological:      Mental Status: She is alert.          ASSESSMENT/PLAN:   (J45.20) Mild i

## 2021-08-12 LAB
HPV I/H RISK 1 DNA SPEC QL NAA+PROBE: NEGATIVE
LAST PAP RESULT: NORMAL

## 2021-10-01 NOTE — TELEPHONE ENCOUNTER
Patient requesting script for Ventolin HFA and Albuterol. Both are the same medication. Called patient to clarify reason for requesting the same script. Need to know if patient wants both scripts sent to see what insurance will cover.    Patient unava

## 2021-10-03 RX ORDER — ALBUTEROL SULFATE 90 UG/1
AEROSOL, METERED RESPIRATORY (INHALATION)
Qty: 8.5 G | Refills: 0 | OUTPATIENT
Start: 2021-10-03

## 2021-11-12 ENCOUNTER — HOSPITAL ENCOUNTER (OUTPATIENT)
Dept: MAMMOGRAPHY | Age: 41
Discharge: HOME | End: 2021-11-12
Attending: FAMILY MEDICINE
Payer: COMMERCIAL

## 2021-11-12 DIAGNOSIS — Z12.31 BREAST CANCER SCREENING BY MAMMOGRAM: ICD-10-CM

## 2021-11-12 PROCEDURE — 77063 BREAST TOMOSYNTHESIS BI: CPT | Performed by: FAMILY MEDICINE

## 2021-11-12 PROCEDURE — 77067 SCR MAMMO BI INCL CAD: CPT | Performed by: FAMILY MEDICINE

## 2021-11-19 ENCOUNTER — HOSPITAL ENCOUNTER (OUTPATIENT)
Dept: ULTRASOUND IMAGING | Age: 41
Discharge: HOME | End: 2021-11-19
Attending: FAMILY MEDICINE
Payer: COMMERCIAL

## 2021-11-19 ENCOUNTER — HOSPITAL ENCOUNTER (OUTPATIENT)
Dept: MAMMOGRAPHY | Age: 41
Discharge: HOME | End: 2021-11-19
Attending: FAMILY MEDICINE
Payer: COMMERCIAL

## 2021-11-19 DIAGNOSIS — R92.2 INCONCLUSIVE MAMMOGRAM: ICD-10-CM

## 2021-11-19 PROCEDURE — 77061 BREAST TOMOSYNTHESIS UNI: CPT | Performed by: FAMILY MEDICINE

## 2021-11-19 PROCEDURE — 77065 DX MAMMO INCL CAD UNI: CPT | Performed by: FAMILY MEDICINE

## 2021-11-19 PROCEDURE — 76642 ULTRASOUND BREAST LIMITED: CPT | Performed by: FAMILY MEDICINE

## 2021-11-25 NOTE — TELEPHONE ENCOUNTER
Dr. Rochelle Parnell, see message below and advise.      Please respond to pool: EM [facility] LPN/CMA Right total hip replacement No

## 2022-05-18 ENCOUNTER — OFFICE VISIT (OUTPATIENT)
Dept: FAMILY MEDICINE CLINIC | Facility: CLINIC | Age: 42
End: 2022-05-18
Payer: COMMERCIAL

## 2022-05-18 VITALS
OXYGEN SATURATION: 99 % | SYSTOLIC BLOOD PRESSURE: 117 MMHG | TEMPERATURE: 98 F | HEART RATE: 86 BPM | DIASTOLIC BLOOD PRESSURE: 77 MMHG

## 2022-05-18 DIAGNOSIS — J01.00 ACUTE NON-RECURRENT MAXILLARY SINUSITIS: Primary | ICD-10-CM

## 2022-05-18 PROCEDURE — 3074F SYST BP LT 130 MM HG: CPT | Performed by: FAMILY MEDICINE

## 2022-05-18 PROCEDURE — 99213 OFFICE O/P EST LOW 20 MIN: CPT | Performed by: FAMILY MEDICINE

## 2022-05-18 PROCEDURE — 3078F DIAST BP <80 MM HG: CPT | Performed by: FAMILY MEDICINE

## 2022-05-18 RX ORDER — AMOXICILLIN AND CLAVULANATE POTASSIUM 875; 125 MG/1; MG/1
1 TABLET, FILM COATED ORAL 2 TIMES DAILY
Qty: 20 TABLET | Refills: 0 | Status: SHIPPED | OUTPATIENT
Start: 2022-05-18 | End: 2022-05-28

## 2022-05-18 NOTE — PATIENT INSTRUCTIONS
Take antibiotics with food and plenty of water. Eat yogurt or take probiotic daily. (Jos Ojeda is a good example of an OTC probiotic)  Make sure to finish the entire antibiotic treatment. Increase fluids and rest.     Use OTC meds for comfort as needed--  Ibuprofen/Tylenol for fever/pain  Use Benadryl at bedtime to reduce drainage and promote rest.  Zyrtec/Claritin/Allegra in the AM to reduce nasal drainage without sedation. Use saline nasal sprays to reduce congestion and thin secretions. Use Delsym for cough. Consider applying gerry's vapo-rub or eucayptus oil to chest and feet at bedtime to reduce chest and nasal congestion. Warm tea with honey, cough lozenges, vaporizers/steam etc.    Monitor symptoms and contact the office if no better in 2-3 days.

## 2022-05-19 LAB — SARS-COV-2 RNA RESP QL NAA+PROBE: NOT DETECTED

## 2022-08-05 RX ORDER — PHENTERMINE HYDROCHLORIDE 37.5 MG/1
37.5 TABLET ORAL DAILY
COMMUNITY
Start: 2022-03-22 | End: 2022-08-06 | Stop reason: ALTCHOICE

## 2022-08-06 ENCOUNTER — OFFICE VISIT (OUTPATIENT)
Dept: FAMILY MEDICINE CLINIC | Facility: CLINIC | Age: 42
End: 2022-08-06
Payer: COMMERCIAL

## 2022-08-06 VITALS
SYSTOLIC BLOOD PRESSURE: 124 MMHG | HEART RATE: 80 BPM | HEIGHT: 65 IN | DIASTOLIC BLOOD PRESSURE: 76 MMHG | WEIGHT: 201 LBS | TEMPERATURE: 98 F | BODY MASS INDEX: 33.49 KG/M2

## 2022-08-06 DIAGNOSIS — R73.03 PRE-DIABETES: ICD-10-CM

## 2022-08-06 DIAGNOSIS — Z12.31 ENCOUNTER FOR SCREENING MAMMOGRAM FOR MALIGNANT NEOPLASM OF BREAST: ICD-10-CM

## 2022-08-06 DIAGNOSIS — J45.20 MILD INTERMITTENT ASTHMA WITHOUT COMPLICATION: ICD-10-CM

## 2022-08-06 DIAGNOSIS — Z00.00 ROUTINE PHYSICAL EXAMINATION: Primary | ICD-10-CM

## 2022-08-06 LAB
CARTRIDGE LOT#: 978 NUMERIC
HEMOGLOBIN A1C: 5.5 % (ref 4.3–5.6)

## 2022-08-06 PROCEDURE — 3078F DIAST BP <80 MM HG: CPT | Performed by: FAMILY MEDICINE

## 2022-08-06 PROCEDURE — 90677 PCV20 VACCINE IM: CPT | Performed by: FAMILY MEDICINE

## 2022-08-06 PROCEDURE — 99396 PREV VISIT EST AGE 40-64: CPT | Performed by: FAMILY MEDICINE

## 2022-08-06 PROCEDURE — 3008F BODY MASS INDEX DOCD: CPT | Performed by: FAMILY MEDICINE

## 2022-08-06 PROCEDURE — 83036 HEMOGLOBIN GLYCOSYLATED A1C: CPT | Performed by: FAMILY MEDICINE

## 2022-08-06 PROCEDURE — 90471 IMMUNIZATION ADMIN: CPT | Performed by: FAMILY MEDICINE

## 2022-08-06 PROCEDURE — 3074F SYST BP LT 130 MM HG: CPT | Performed by: FAMILY MEDICINE

## 2022-10-13 ENCOUNTER — OFFICE VISIT (OUTPATIENT)
Dept: FAMILY MEDICINE CLINIC | Facility: CLINIC | Age: 42
End: 2022-10-13
Payer: COMMERCIAL

## 2022-10-13 VITALS
HEIGHT: 65 IN | WEIGHT: 203 LBS | DIASTOLIC BLOOD PRESSURE: 78 MMHG | HEART RATE: 87 BPM | TEMPERATURE: 97 F | OXYGEN SATURATION: 99 % | SYSTOLIC BLOOD PRESSURE: 113 MMHG | RESPIRATION RATE: 18 BRPM | BODY MASS INDEX: 33.82 KG/M2

## 2022-10-13 DIAGNOSIS — M54.50 ACUTE BILATERAL LOW BACK PAIN WITHOUT SCIATICA: Primary | ICD-10-CM

## 2022-10-13 LAB
APPEARANCE: CLEAR
BILIRUBIN: NEGATIVE
GLUCOSE (URINE DIPSTICK): NEGATIVE MG/DL
KETONES (URINE DIPSTICK): NEGATIVE MG/DL
LEUKOCYTES: NEGATIVE
MULTISTIX LOT#: NORMAL NUMERIC
NITRITE, URINE: NEGATIVE
OCCULT BLOOD: NEGATIVE
PH, URINE: 7 (ref 4.5–8)
PROTEIN (URINE DIPSTICK): NEGATIVE MG/DL
SPECIFIC GRAVITY: >=1.03 (ref 1–1.03)
URINE-COLOR: YELLOW
UROBILINOGEN,SEMI-QN: 0.2 MG/DL (ref 0–1.9)

## 2022-10-13 PROCEDURE — 99213 OFFICE O/P EST LOW 20 MIN: CPT | Performed by: NURSE PRACTITIONER

## 2022-10-13 PROCEDURE — 3078F DIAST BP <80 MM HG: CPT | Performed by: NURSE PRACTITIONER

## 2022-10-13 PROCEDURE — 81003 URINALYSIS AUTO W/O SCOPE: CPT | Performed by: NURSE PRACTITIONER

## 2022-10-13 PROCEDURE — 3074F SYST BP LT 130 MM HG: CPT | Performed by: NURSE PRACTITIONER

## 2022-10-13 PROCEDURE — 3008F BODY MASS INDEX DOCD: CPT | Performed by: NURSE PRACTITIONER

## 2022-11-19 ENCOUNTER — HOSPITAL ENCOUNTER (OUTPATIENT)
Dept: MAMMOGRAPHY | Age: 42
Discharge: HOME | End: 2022-11-19
Attending: FAMILY MEDICINE
Payer: COMMERCIAL

## 2022-11-19 DIAGNOSIS — Z12.31 ENCOUNTER FOR SCREENING MAMMOGRAM FOR MALIGNANT NEOPLASM OF BREAST: ICD-10-CM

## 2022-11-19 PROCEDURE — 77063 BREAST TOMOSYNTHESIS BI: CPT | Performed by: FAMILY MEDICINE

## 2022-11-19 PROCEDURE — 77067 SCR MAMMO BI INCL CAD: CPT | Performed by: FAMILY MEDICINE

## 2022-11-21 DIAGNOSIS — R92.8 ABNORMAL MAMMOGRAM OF RIGHT BREAST: Primary | ICD-10-CM

## 2022-12-02 ENCOUNTER — HOSPITAL ENCOUNTER (OUTPATIENT)
Dept: MAMMOGRAPHY | Age: 42
Discharge: HOME | End: 2022-12-02
Attending: FAMILY MEDICINE
Payer: COMMERCIAL

## 2022-12-02 DIAGNOSIS — R92.8 ABNORMAL MAMMOGRAM OF RIGHT BREAST: ICD-10-CM

## 2022-12-02 PROCEDURE — 77065 DX MAMMO INCL CAD UNI: CPT | Performed by: FAMILY MEDICINE

## 2022-12-02 PROCEDURE — 77061 BREAST TOMOSYNTHESIS UNI: CPT | Performed by: FAMILY MEDICINE

## 2023-03-03 ENCOUNTER — OFFICE VISIT (OUTPATIENT)
Dept: FAMILY MEDICINE CLINIC | Facility: CLINIC | Age: 43
End: 2023-03-03
Payer: COMMERCIAL

## 2023-03-03 VITALS
TEMPERATURE: 98 F | DIASTOLIC BLOOD PRESSURE: 84 MMHG | BODY MASS INDEX: 33.32 KG/M2 | HEIGHT: 65 IN | HEART RATE: 77 BPM | OXYGEN SATURATION: 98 % | WEIGHT: 200 LBS | SYSTOLIC BLOOD PRESSURE: 120 MMHG | RESPIRATION RATE: 18 BRPM

## 2023-03-03 DIAGNOSIS — N30.01 ACUTE CYSTITIS WITH HEMATURIA: Primary | ICD-10-CM

## 2023-03-03 LAB
APPEARANCE: CLEAR
BILIRUBIN: NEGATIVE
GLUCOSE (URINE DIPSTICK): NEGATIVE MG/DL
KETONES (URINE DIPSTICK): NEGATIVE MG/DL
MULTISTIX LOT#: ABNORMAL NUMERIC
NITRITE, URINE: POSITIVE
PH, URINE: 6 (ref 4.5–8)
SPECIFIC GRAVITY: 1.03 (ref 1–1.03)
URINE-COLOR: YELLOW
UROBILINOGEN,SEMI-QN: 0.2 MG/DL (ref 0–1.9)

## 2023-03-03 PROCEDURE — 3074F SYST BP LT 130 MM HG: CPT | Performed by: NURSE PRACTITIONER

## 2023-03-03 PROCEDURE — 3008F BODY MASS INDEX DOCD: CPT | Performed by: NURSE PRACTITIONER

## 2023-03-03 PROCEDURE — 81003 URINALYSIS AUTO W/O SCOPE: CPT | Performed by: NURSE PRACTITIONER

## 2023-03-03 PROCEDURE — 87086 URINE CULTURE/COLONY COUNT: CPT | Performed by: NURSE PRACTITIONER

## 2023-03-03 PROCEDURE — 87077 CULTURE AEROBIC IDENTIFY: CPT | Performed by: NURSE PRACTITIONER

## 2023-03-03 PROCEDURE — 99213 OFFICE O/P EST LOW 20 MIN: CPT | Performed by: NURSE PRACTITIONER

## 2023-03-03 PROCEDURE — 3079F DIAST BP 80-89 MM HG: CPT | Performed by: NURSE PRACTITIONER

## 2023-03-03 PROCEDURE — 87186 SC STD MICRODIL/AGAR DIL: CPT | Performed by: NURSE PRACTITIONER

## 2023-03-03 RX ORDER — NITROFURANTOIN 25; 75 MG/1; MG/1
100 CAPSULE ORAL 2 TIMES DAILY
Qty: 14 CAPSULE | Refills: 0 | Status: SHIPPED | OUTPATIENT
Start: 2023-03-03 | End: 2023-03-10

## 2023-03-04 NOTE — PATIENT INSTRUCTIONS
Complete full course of antibiotics. Plenty of fluids  Urine culture sent to lab  Over the counter Tylenol/Motrin as needed for pain/discomfort. Follow up in 2-3 days if symptoms do not improve or worsen. Return to clinic or see your primary care provider immediately if you experience nausea, vomiting, or fever. What can you do to help prevent bladder infections? Urinate often during the day. You should also urinate after you have sex. If you are a woman, it is important to:   Keep the area around your vagina clean. Wipe from front to back after you go to the bathroom. Gently wash the area around your vagina when you bathe or shower. Wear cotton underwear. Use pantyhose with cotton crotches. Avoid tight clothing. Wear loose pants. Do not wear a wet bathing suit for a long time.

## 2023-05-10 ENCOUNTER — TELEMEDICINE (OUTPATIENT)
Dept: FAMILY MEDICINE CLINIC | Facility: CLINIC | Age: 43
End: 2023-05-10
Payer: COMMERCIAL

## 2023-05-10 DIAGNOSIS — J45.21 MILD INTERMITTENT ASTHMA WITH ACUTE EXACERBATION: Primary | ICD-10-CM

## 2023-05-10 PROCEDURE — 99213 OFFICE O/P EST LOW 20 MIN: CPT | Performed by: FAMILY MEDICINE

## 2023-05-10 RX ORDER — FLUTICASONE PROPIONATE 110 UG/1
2 AEROSOL, METERED RESPIRATORY (INHALATION) 2 TIMES DAILY
Qty: 1 EACH | Refills: 1 | Status: SHIPPED | OUTPATIENT
Start: 2023-05-10 | End: 2024-05-04

## 2023-05-10 RX ORDER — ALBUTEROL SULFATE 90 UG/1
1 AEROSOL, METERED RESPIRATORY (INHALATION) EVERY 6 HOURS PRN
Qty: 18 G | Refills: 3 | Status: SHIPPED | OUTPATIENT
Start: 2023-05-10

## 2023-07-11 ENCOUNTER — APPOINTMENT (OUTPATIENT)
Dept: GENERAL RADIOLOGY | Age: 43
End: 2023-07-11
Attending: PHYSICIAN ASSISTANT
Payer: COMMERCIAL

## 2023-07-11 ENCOUNTER — HOSPITAL ENCOUNTER (OUTPATIENT)
Age: 43
Discharge: HOME | End: 2023-07-11
Payer: COMMERCIAL

## 2023-07-11 VITALS
DIASTOLIC BLOOD PRESSURE: 78 MMHG | RESPIRATION RATE: 16 BRPM | BODY MASS INDEX: 33 KG/M2 | SYSTOLIC BLOOD PRESSURE: 115 MMHG | OXYGEN SATURATION: 97 % | WEIGHT: 200 LBS | TEMPERATURE: 97 F | HEART RATE: 91 BPM

## 2023-07-11 DIAGNOSIS — S29.011A MUSCLE STRAIN OF CHEST WALL, INITIAL ENCOUNTER: Primary | ICD-10-CM

## 2023-07-11 DIAGNOSIS — R07.81 PLEURITIC CHEST PAIN: ICD-10-CM

## 2023-07-11 LAB
#MXD IC: 0.9 X10ˆ3/UL (ref 0.1–1)
BUN BLD-MCNC: 18 MG/DL (ref 7–18)
CHLORIDE BLD-SCNC: 101 MMOL/L (ref 98–112)
CO2 BLD-SCNC: 25 MMOL/L (ref 21–32)
CREAT BLD-MCNC: 1 MG/DL
DDIMER WHOLE BLOOD: <200 NG/ML DDU (ref ?–400)
GFR SERPLBLD BASED ON 1.73 SQ M-ARVRAT: 72 ML/MIN/1.73M2 (ref 60–?)
GLUCOSE BLD-MCNC: 113 MG/DL (ref 70–99)
HCT VFR BLD AUTO: 37.7 %
HCT VFR BLD CALC: 39 %
HGB BLD-MCNC: 11.9 G/DL
ISTAT IONIZED CALCIUM FOR CHEM 8: 1.23 MMOL/L (ref 1.12–1.32)
LYMPHOCYTES # BLD AUTO: 3 X10ˆ3/UL (ref 1–4)
LYMPHOCYTES NFR BLD AUTO: 27.6 %
MCH RBC QN AUTO: 28.7 PG (ref 26–34)
MCHC RBC AUTO-ENTMCNC: 31.6 G/DL (ref 31–37)
MCV RBC AUTO: 91.1 FL (ref 80–100)
MIXED CELL %: 7.9 %
NEUTROPHILS # BLD AUTO: 6.9 X10ˆ3/UL (ref 1.5–7.7)
NEUTROPHILS NFR BLD AUTO: 64.5 %
PLATELET # BLD AUTO: 315 X10ˆ3/UL (ref 150–450)
POTASSIUM BLD-SCNC: 4.1 MMOL/L (ref 3.6–5.1)
RBC # BLD AUTO: 4.14 X10ˆ6/UL
SODIUM BLD-SCNC: 137 MMOL/L (ref 136–145)
TROPONIN I BLD-MCNC: <0.02 NG/ML
WBC # BLD AUTO: 10.8 X10ˆ3/UL (ref 4–11)

## 2023-07-11 PROCEDURE — 93005 ELECTROCARDIOGRAM TRACING: CPT

## 2023-07-11 PROCEDURE — 85025 COMPLETE CBC W/AUTO DIFF WBC: CPT | Performed by: PHYSICIAN ASSISTANT

## 2023-07-11 PROCEDURE — 85378 FIBRIN DEGRADE SEMIQUANT: CPT | Performed by: PHYSICIAN ASSISTANT

## 2023-07-11 PROCEDURE — 80047 BASIC METABLC PNL IONIZED CA: CPT

## 2023-07-11 PROCEDURE — 84484 ASSAY OF TROPONIN QUANT: CPT

## 2023-07-11 PROCEDURE — 71046 X-RAY EXAM CHEST 2 VIEWS: CPT | Performed by: PHYSICIAN ASSISTANT

## 2023-07-11 RX ORDER — CYCLOBENZAPRINE HCL 10 MG
10 TABLET ORAL 3 TIMES DAILY PRN
Qty: 20 TABLET | Refills: 0 | Status: SHIPPED | OUTPATIENT
Start: 2023-07-11 | End: 2023-07-18

## 2023-07-11 RX ORDER — IBUPROFEN 600 MG/1
600 TABLET ORAL ONCE
Status: COMPLETED | OUTPATIENT
Start: 2023-07-11 | End: 2023-07-11

## 2023-07-11 RX ORDER — NAPROXEN 500 MG/1
500 TABLET ORAL 2 TIMES DAILY PRN
Qty: 20 TABLET | Refills: 0 | Status: SHIPPED | OUTPATIENT
Start: 2023-07-11 | End: 2023-07-21

## 2023-07-11 NOTE — ED INITIAL ASSESSMENT (HPI)
Patient presents to IC with c/o severe left sided back pain x 3 days. h/o asthma-has been using it more frequently with the air quality. No fever or urinary symptoms noted.

## 2023-07-12 LAB
ATRIAL RATE: 84 BPM
P AXIS: 68 DEGREES
P-R INTERVAL: 142 MS
Q-T INTERVAL: 374 MS
QRS DURATION: 82 MS
QTC CALCULATION (BEZET): 441 MS
R AXIS: 60 DEGREES
T AXIS: 55 DEGREES
VENTRICULAR RATE: 84 BPM

## 2023-08-30 ENCOUNTER — OFFICE VISIT (OUTPATIENT)
Dept: FAMILY MEDICINE CLINIC | Facility: CLINIC | Age: 43
End: 2023-08-30
Payer: COMMERCIAL

## 2023-08-30 VITALS
RESPIRATION RATE: 18 BRPM | OXYGEN SATURATION: 98 % | DIASTOLIC BLOOD PRESSURE: 77 MMHG | HEART RATE: 77 BPM | SYSTOLIC BLOOD PRESSURE: 131 MMHG

## 2023-08-30 DIAGNOSIS — L03.012 PARONYCHIA OF LEFT LITTLE FINGER: ICD-10-CM

## 2023-08-30 DIAGNOSIS — R39.9 UTI SYMPTOMS: Primary | ICD-10-CM

## 2023-08-30 LAB
BILIRUBIN: NEGATIVE
GLUCOSE (URINE DIPSTICK): NEGATIVE MG/DL
KETONES (URINE DIPSTICK): NEGATIVE MG/DL
MULTISTIX LOT#: ABNORMAL NUMERIC
NITRITE, URINE: POSITIVE
PH, URINE: 8.5 (ref 4.5–8)
PROTEIN (URINE DIPSTICK): 30 MG/DL
SPECIFIC GRAVITY: 1.02 (ref 1–1.03)
UROBILINOGEN,SEMI-QN: 1 MG/DL (ref 0–1.9)

## 2023-08-30 PROCEDURE — 87086 URINE CULTURE/COLONY COUNT: CPT | Performed by: PHYSICIAN ASSISTANT

## 2023-08-30 PROCEDURE — 81003 URINALYSIS AUTO W/O SCOPE: CPT | Performed by: PHYSICIAN ASSISTANT

## 2023-08-30 PROCEDURE — 3078F DIAST BP <80 MM HG: CPT | Performed by: PHYSICIAN ASSISTANT

## 2023-08-30 PROCEDURE — 3075F SYST BP GE 130 - 139MM HG: CPT | Performed by: PHYSICIAN ASSISTANT

## 2023-08-30 PROCEDURE — 99213 OFFICE O/P EST LOW 20 MIN: CPT | Performed by: PHYSICIAN ASSISTANT

## 2023-08-30 RX ORDER — PHENAZOPYRIDINE HYDROCHLORIDE 200 MG/1
200 TABLET, FILM COATED ORAL 3 TIMES DAILY PRN
Qty: 6 TABLET | Refills: 0 | Status: SHIPPED | OUTPATIENT
Start: 2023-08-30 | End: 2023-09-01

## 2023-08-30 RX ORDER — CEPHALEXIN 500 MG/1
500 CAPSULE ORAL 3 TIMES DAILY
Qty: 21 CAPSULE | Refills: 0 | Status: SHIPPED | OUTPATIENT
Start: 2023-08-30 | End: 2023-09-06

## 2023-09-06 ENCOUNTER — LAB ENCOUNTER (OUTPATIENT)
Dept: LAB | Age: 43
End: 2023-09-06
Attending: FAMILY MEDICINE
Payer: COMMERCIAL

## 2023-09-06 ENCOUNTER — OFFICE VISIT (OUTPATIENT)
Dept: FAMILY MEDICINE CLINIC | Facility: CLINIC | Age: 43
End: 2023-09-06

## 2023-09-06 VITALS
TEMPERATURE: 99 F | DIASTOLIC BLOOD PRESSURE: 76 MMHG | RESPIRATION RATE: 16 BRPM | SYSTOLIC BLOOD PRESSURE: 111 MMHG | HEART RATE: 79 BPM | HEIGHT: 65 IN | BODY MASS INDEX: 34.09 KG/M2 | WEIGHT: 204.63 LBS

## 2023-09-06 DIAGNOSIS — N92.6 IRREGULAR PERIODS: ICD-10-CM

## 2023-09-06 DIAGNOSIS — Z00.00 ROUTINE PHYSICAL EXAMINATION: Primary | ICD-10-CM

## 2023-09-06 DIAGNOSIS — R06.83 SNORING: ICD-10-CM

## 2023-09-06 DIAGNOSIS — Z00.00 ROUTINE PHYSICAL EXAMINATION: ICD-10-CM

## 2023-09-06 DIAGNOSIS — J45.21 MILD INTERMITTENT ASTHMA WITH ACUTE EXACERBATION: ICD-10-CM

## 2023-09-06 DIAGNOSIS — Z12.31 ENCOUNTER FOR SCREENING MAMMOGRAM FOR BREAST CANCER: ICD-10-CM

## 2023-09-06 LAB
ALBUMIN SERPL-MCNC: 3.5 G/DL (ref 3.4–5)
ALBUMIN/GLOB SERPL: 0.9 {RATIO} (ref 1–2)
ALP LIVER SERPL-CCNC: 51 U/L
ALT SERPL-CCNC: 36 U/L
ANION GAP SERPL CALC-SCNC: 6 MMOL/L (ref 0–18)
AST SERPL-CCNC: 20 U/L (ref 15–37)
BILIRUB SERPL-MCNC: 0.6 MG/DL (ref 0.1–2)
BUN BLD-MCNC: 13 MG/DL (ref 7–18)
BUN/CREAT SERPL: 12.1 (ref 10–20)
CALCIUM BLD-MCNC: 9 MG/DL (ref 8.5–10.1)
CHLORIDE SERPL-SCNC: 109 MMOL/L (ref 98–112)
CHOLEST SERPL-MCNC: 135 MG/DL (ref ?–200)
CO2 SERPL-SCNC: 24 MMOL/L (ref 21–32)
CREAT BLD-MCNC: 1.07 MG/DL
DEPRECATED RDW RBC AUTO: 48.3 FL (ref 35.1–46.3)
EGFRCR SERPLBLD CKD-EPI 2021: 66 ML/MIN/1.73M2 (ref 60–?)
ERYTHROCYTE [DISTWIDTH] IN BLOOD BY AUTOMATED COUNT: 14 % (ref 11–15)
ESTRADIOL SERPL-MCNC: 72.2 PG/ML
FSH SERPL-ACNC: 4.2 MIU/ML
GLOBULIN PLAS-MCNC: 3.9 G/DL (ref 2.8–4.4)
GLUCOSE BLD-MCNC: 86 MG/DL (ref 70–99)
HCT VFR BLD AUTO: 40.2 %
HDLC SERPL-MCNC: 66 MG/DL (ref 40–59)
HGB BLD-MCNC: 12.8 G/DL
LDLC SERPL CALC-MCNC: 58 MG/DL (ref ?–100)
LH SERPL-ACNC: 1.8 MIU/ML
MCH RBC QN AUTO: 29.4 PG (ref 26–34)
MCHC RBC AUTO-ENTMCNC: 31.8 G/DL (ref 31–37)
MCV RBC AUTO: 92.4 FL
NONHDLC SERPL-MCNC: 69 MG/DL (ref ?–130)
OSMOLALITY SERPL CALC.SUM OF ELEC: 287 MOSM/KG (ref 275–295)
PLATELET # BLD AUTO: 346 10(3)UL (ref 150–450)
POTASSIUM SERPL-SCNC: 4.1 MMOL/L (ref 3.5–5.1)
PROT SERPL-MCNC: 7.4 G/DL (ref 6.4–8.2)
RBC # BLD AUTO: 4.35 X10(6)UL
SODIUM SERPL-SCNC: 139 MMOL/L (ref 136–145)
TRIGL SERPL-MCNC: 46 MG/DL (ref 30–149)
TSI SER-ACNC: 1.57 MIU/ML (ref 0.36–3.74)
VIT D+METAB SERPL-MCNC: 30.9 NG/ML (ref 30–100)
VLDLC SERPL CALC-MCNC: 7 MG/DL (ref 0–30)
WBC # BLD AUTO: 8.2 X10(3) UL (ref 4–11)

## 2023-09-06 PROCEDURE — 85027 COMPLETE CBC AUTOMATED: CPT

## 2023-09-06 PROCEDURE — 83002 ASSAY OF GONADOTROPIN (LH): CPT

## 2023-09-06 PROCEDURE — 99396 PREV VISIT EST AGE 40-64: CPT | Performed by: FAMILY MEDICINE

## 2023-09-06 PROCEDURE — 82306 VITAMIN D 25 HYDROXY: CPT

## 2023-09-06 PROCEDURE — 83001 ASSAY OF GONADOTROPIN (FSH): CPT

## 2023-09-06 PROCEDURE — 3078F DIAST BP <80 MM HG: CPT | Performed by: FAMILY MEDICINE

## 2023-09-06 PROCEDURE — 80061 LIPID PANEL: CPT

## 2023-09-06 PROCEDURE — 82670 ASSAY OF TOTAL ESTRADIOL: CPT

## 2023-09-06 PROCEDURE — 3074F SYST BP LT 130 MM HG: CPT | Performed by: FAMILY MEDICINE

## 2023-09-06 PROCEDURE — 3008F BODY MASS INDEX DOCD: CPT | Performed by: FAMILY MEDICINE

## 2023-09-06 PROCEDURE — 36415 COLL VENOUS BLD VENIPUNCTURE: CPT

## 2023-09-06 PROCEDURE — 84443 ASSAY THYROID STIM HORMONE: CPT

## 2023-09-06 PROCEDURE — 80053 COMPREHEN METABOLIC PANEL: CPT

## 2023-09-06 RX ORDER — FLUTICASONE PROPIONATE 110 UG/1
2 AEROSOL, METERED RESPIRATORY (INHALATION) 2 TIMES DAILY
Qty: 1 EACH | Refills: 3 | Status: SHIPPED | OUTPATIENT
Start: 2023-09-06 | End: 2024-08-31

## 2023-11-07 ENCOUNTER — OFFICE VISIT (OUTPATIENT)
Dept: SLEEP CENTER | Age: 43
End: 2023-11-07
Attending: FAMILY MEDICINE
Payer: COMMERCIAL

## 2023-11-07 DIAGNOSIS — R06.83 SNORING: ICD-10-CM

## 2023-11-07 PROCEDURE — 95806 SLEEP STUDY UNATT&RESP EFFT: CPT

## 2023-11-10 NOTE — PROCEDURES
[facility]  Accredited by the [facility] ([facility])    PATIENT'S NAME: Renee Wiseman   ATTENDING PHYSICIAN: She Chiu DO   REFERRING PHYSICIAN: She Chiu DO   PATIENT ACCOUNT #: [de-identified] LOCATION: [address] #: [id number] YOB: 1980   DATE OF STUDY: 11/07/2023       SLEEP STUDY REPORT    STUDY TYPE:  Home sleep test.    INDICATION:  Suspected obstructive sleep apnea (ICD-10 code G47.33), in a patient with witnessed apneic events, loud disruptive snoring, asthma, an [place] Sleepiness Scale score of 0/24, and a body mass index of 34.0. RESULTS:  The patient underwent home sleep test with measurement of her nasal airflow, nasal air pressure, snoring, chest and abdominal wall motion, oximetry, and body position. I have reviewed the entirety of the raw data of this study. During the study, the total recording time is 490 minutes. The lights-out clock time is 10:47 p.m.; the lights-on clock time is 6:57 a.m. The apnea plus hypopnea index is 3.8 events per hour. The supine apnea plus hypopnea index is 9.2 events per hour. The average oxygen saturation is 96.6%, the minimum oxygen saturation is 93%, and the patient spent 0% of the testing with saturations 88% or less. The average heart rate was 79 beats per minute, and the patient spent approximately 8% of the test in the supine position. INTERPRETATION:  The data generated from this study is consistent with mild obstructive sleep apnea only while in the supine position (ICD-10 code G47.33). It is notable, however, that for the entirety of the testing, the apnea plus hypopnea index was within normal limits. RECOMMENDATIONS:    1. Positional therapies. Would consider sewing tennis balls into the back of the nightshirt, use of a long side-lying pillow, or use of 1 of the proprietary devices to assist with side-lying positioning such as the Rematee or the Zzoma . The patient should avoid the supine position. 2.   Weight loss. 3.   Avoid alcohol. 4.   Avoid sedating drug. 5.   The patient should not drive if at all sleepy. Please do not hesitate to contact me if there is any question whatsoever regarding interpretation of this study.     Dictated By Angeles Lewis MD  d: 11/10/2023 11:56:05  t: 11/10/2023 13:02:16  [facility] [id number]  Z/    cc: Salma Malagon DO

## 2023-12-20 ENCOUNTER — HOSPITAL ENCOUNTER (OUTPATIENT)
Dept: MAMMOGRAPHY | Age: 43
Discharge: HOME | End: 2023-12-20
Attending: FAMILY MEDICINE
Payer: COMMERCIAL

## 2023-12-20 DIAGNOSIS — Z12.31 ENCOUNTER FOR SCREENING MAMMOGRAM FOR BREAST CANCER: ICD-10-CM

## 2023-12-20 PROCEDURE — 77067 SCR MAMMO BI INCL CAD: CPT | Performed by: FAMILY MEDICINE

## 2023-12-20 PROCEDURE — 77063 BREAST TOMOSYNTHESIS BI: CPT | Performed by: FAMILY MEDICINE

## 2023-12-26 DIAGNOSIS — R92.333 HETEROGENEOUSLY DENSE TISSUE OF BOTH BREASTS ON MAMMOGRAPHY: Primary | ICD-10-CM

## 2023-12-28 ENCOUNTER — TELEPHONE (OUTPATIENT)
Dept: HEMATOLOGY/ONCOLOGY | Facility: HOSPITAL | Age: 43
End: 2023-12-28

## 2023-12-28 NOTE — TELEPHONE ENCOUNTER
Received call from patient asking about high risk breast assessment and ultrasound. Patient recently completed mammogram which was normal, due to density and a family history of ovarian cancer she was recommended for high risk. Patient will have her breast ultrasound done on 3/5, will notify Dr. Jase Wilson team of this patient to have her high risk screening after upcoming ultrasound. Direct number of navigator given to patient to call back as needed.

## 2024-01-04 ENCOUNTER — TELEPHONE (OUTPATIENT)
Dept: SURGERY | Facility: CLINIC | Age: 44
End: 2024-01-04

## 2024-01-04 NOTE — TELEPHONE ENCOUNTER
Called and spoke with patient. New patient medical history form filled out. Medical/Surgical/Family history and allergies reviewed and updated. All questions answered.

## 2024-01-09 ENCOUNTER — OFFICE VISIT (OUTPATIENT)
Dept: SURGERY | Facility: CLINIC | Age: 44
End: 2024-01-09
Payer: COMMERCIAL

## 2024-01-09 ENCOUNTER — GENETICS ENCOUNTER (OUTPATIENT)
Dept: GENETICS | Facility: HOSPITAL | Age: 44
End: 2024-01-09
Attending: GENETIC COUNSELOR, MS
Payer: COMMERCIAL

## 2024-01-09 ENCOUNTER — NURSE ONLY (OUTPATIENT)
Dept: HEMATOLOGY/ONCOLOGY | Facility: HOSPITAL | Age: 44
End: 2024-01-09
Attending: GENETIC COUNSELOR, MS
Payer: COMMERCIAL

## 2024-01-09 VITALS
BODY MASS INDEX: 34.99 KG/M2 | HEART RATE: 94 BPM | SYSTOLIC BLOOD PRESSURE: 128 MMHG | TEMPERATURE: 98 F | DIASTOLIC BLOOD PRESSURE: 83 MMHG | OXYGEN SATURATION: 100 % | WEIGHT: 210 LBS | RESPIRATION RATE: 18 BRPM | HEIGHT: 65 IN

## 2024-01-09 DIAGNOSIS — R92.333 HETEROGENEOUSLY DENSE TISSUE OF BOTH BREASTS ON MAMMOGRAPHY: Primary | ICD-10-CM

## 2024-01-09 DIAGNOSIS — Z80.41 FAMILY HISTORY OF OVARIAN CANCER: Primary | ICD-10-CM

## 2024-01-09 PROCEDURE — 3079F DIAST BP 80-89 MM HG: CPT | Performed by: SURGERY

## 2024-01-09 PROCEDURE — 99243 OFF/OP CNSLTJ NEW/EST LOW 30: CPT | Performed by: SURGERY

## 2024-01-09 PROCEDURE — 3008F BODY MASS INDEX DOCD: CPT | Performed by: SURGERY

## 2024-01-09 PROCEDURE — 36415 COLL VENOUS BLD VENIPUNCTURE: CPT

## 2024-01-09 PROCEDURE — 96040 HC GENETIC COUNSELING EA 30 MIN: CPT | Performed by: GENETIC COUNSELOR, MS

## 2024-01-09 PROCEDURE — 3074F SYST BP LT 130 MM HG: CPT | Performed by: SURGERY

## 2024-01-09 NOTE — PATIENT INSTRUCTIONS
Annual mammogram 12/2024. Please ask PCP to continue to order these  Annual screening ultrasound is scheduled for 3/5/2024. You should undergo a screening ultrasound every year  Please return to breast clinic if any concerns or abnormalities, otherwise you need one breast exam every year by OB/GYN or PCP

## 2024-01-09 NOTE — CONSULTS
Breast Surgery New Patient Consultation - [facility]    Kita Reece is a 43 year old patient, referred by Dr. Colleen Weiler, who presents for evaluation of breast cancer risk.    History of Present Illness:   Ms. Kita Reece is a 43 year old woman with a past history significant for asthma who presents for evaluation of breast cancer risk. She denies any palpable masses, nipple discharge, skin changes, or axillary adenopathy. She does not have breast pain. She does not have significant past history for breast diseases or breast biopsy. She has had several call backs after mammography, but no biopsy in the past. She does not have family history of breast cancer. She has a family history of ovarian cancer in her paternal grandmother. Unsure if she passed away from ovarian cancer or kidney disease. She does not have a history of genetic testing.          Past Medical History:   Diagnosis Date    Asthma     Lipid screening 2007    per:NG       Past Surgical History:   Procedure Laterality Date             Gynecological History:  Menarche at age 13 and LMP 23  Pt is a   Pt was 32 years old at time of first pregnancy.    She has cumulative breastfeeding history of 12 months  Age of Menopause: n/a  Type: n/a  She denies any history of hormone replacement therapy   She has history of oral contraceptive use for 16 years, last .   She denies infertility treatment to achieve pregnancy.    Medications:     fluticasone propionate (FLOVENT HFA) 110 MCG/ACT Inhalation Aerosol Inhale 2 puffs into the lungs 2 (two) times daily. 1 each 3    VENTOLIN  (90 Base) MCG/ACT Inhalation Aero Soln Inhale 1 puff into the lungs every 6 (six) hours as needed for Wheezing. 18 g 3    ALBUTEROL SULFATE  (90 Base) MCG/ACT Inhalation Aero Soln INHALE 1 PUFF INTO THE LUNGS EVERY 6 HOURS AS NEEDED FOR WHEEZING 8.5 g 0       Allergies:    Allergies   Allergen Reactions    Shellfish HIVES     Latex SWELLING    Seasonal OTHER (SEE COMMENTS)     .       Family History:   Family History   Problem Relation Age of Onset    Hypertension Mother     High Cholesterol Mother     Asthma Father     Diabetes Father     Hypertension Father     Hypertension Sister     Other (hysterectomy) Sister     Hypertension Brother     Hypertension Brother     Hypertension Maternal Grandmother     Kidney Disease Maternal Grandmother     Ovarian Cancer Paternal Grandmother         post    Diabetes Paternal Grandmother     COPD Paternal Grandfather     Cancer Maternal Aunt         unknown what type       She is not of Ashkenazi Religious ancestry.    Social History:  History   Alcohol Use    Yes     Comment: socially       History   Smoking Status    Never   Smokeless Tobacco    Never       Review of Systems:    Review of Systems   Constitutional:  Negative for activity change, appetite change, chills, fatigue and unexpected weight change.   HENT:  Negative for ear pain, hearing loss, nosebleeds, sore throat, trouble swallowing and voice change.    Eyes:  Negative for pain and visual disturbance.   Respiratory:  Negative for cough, chest tightness and shortness of breath.    Cardiovascular:  Negative for chest pain, palpitations and leg swelling.   Gastrointestinal:  Negative for nausea, vomiting, abdominal pain, diarrhea, constipation and blood in stool.   Endocrine: Negative for cold intolerance and heat intolerance.   Genitourinary:  Negative for dysuria, hematuria and difficulty urinating.   Musculoskeletal:  Negative for back pain, joint swelling, joint pain and neck pain.   Skin:  Negative for color change, rash and wound.   Allergic/Immunologic: Negative for environmental allergies.   Neurological:  Negative for tremors, syncope, facial asymmetry, speech difficulty and weakness.   Hematological:  Negative for adenopathy. Does not bruise/bleed easily.   Psychiatric/Behavioral:  Negative for hallucinations, behavioral problems,  confusion, agitation and depressed mood.       Otherwise as per HPI.    Physical Exam:    /83 (BP Location: Right arm, Patient Position: Sitting, Cuff Size: adult)   Pulse 94   Temp 97.6 °F (36.4 °C) (Tympanic)   Resp 18   Ht 1.651 m (5' 5\")   Wt 95.3 kg (210 lb)   LMP 07/08/2023 (Approximate)   SpO2 100%   BMI 34.95 kg/m²     Physical Exam  Vitals reviewed.   Constitutional:       Appearance: Normal appearance.   HENT:      Head: Normocephalic and atraumatic.   Eyes:      Extraocular Movements: Extraocular movements intact.      Pupils: Pupils are equal, round, and reactive to light.   Cardiovascular:      Rate and Rhythm: Normal rate.   Pulmonary:      Effort: Pulmonary effort is normal.   Chest:   Breasts:     Right: Normal. No mass, nipple discharge, skin change or tenderness.      Left: Normal. No mass, nipple discharge, skin change or tenderness.   Abdominal:      General: Abdomen is flat.      Palpations: Abdomen is soft.   Musculoskeletal:         General: Normal range of motion.      Cervical back: Normal range of motion and neck supple.   Lymphadenopathy:      Upper Body:      Right upper body: No supraclavicular or axillary adenopathy.      Left upper body: No supraclavicular or axillary adenopathy.   Skin:     General: Skin is warm and dry.   Neurological:      General: No focal deficit present.      Mental Status: She is alert and oriented to person, place, and time.   Psychiatric:         Mood and Affect: Mood normal.          Bra size: 36D    Labs/imaging: reviewed in EPIC    Impression:   Ms. Kita Reece is a 43 year old woman presents for high risk of breast cancer    Discussion and Plan:  I had a discussion with the Patient regarding her breast exam. On exam today I found no evidence of disease. I personally reviewed her recent mammography results and we discussed this.    We calculated her Tyrer Cuzick score, which revealed an estimated lifetime breast cancer risk of  approximately 17.4% and a 10-year breast cancer risk of 2.9%. (Results will be scanned into the chart.) She has density C breasts on mammogram. We discussed that her risk is slightly above average, but not to the level of being considered \"high risk\" for breast cancer. We discussed that this risk can be recalculated at any time, especially if new family history arises.     Genetic counseling: will be performed today, patient is interested in genetic testing as well    Further screening:   Mammogram: annual screening mammogram (next in Dec 2024, will ask PCP to order)  Screening ultrasound: annual screening ultrasound (ordered previously by PCP)  MRI: does not qualify at this time    Chemoprophylaxis: n/a    Return to clinic:   1 year for breast exam if needed  See PCP/OB/GYN at least annually for breast exam    She was given ample opportunity for questions and those questions were answered to her satisfaction. She has been  encouraged to contact the office with any questions or concerns prior to her next appointment.     This encounter lasted a total of 30 minutes, more than 50% of which was dedicated to the discussion of management options.     Nam Oates MD  Breast Surgical Oncology      CC: Colleen Weiler, MD

## 2024-01-22 ENCOUNTER — GENETICS ENCOUNTER (OUTPATIENT)
Dept: HEMATOLOGY/ONCOLOGY | Facility: HOSPITAL | Age: 44
End: 2024-01-22

## 2024-01-22 NOTE — PROGRESS NOTES
Referring Provider:                    Nam Oates MD     Additional Provider(s):              Colleen Weiler, DO    Reason for Referral:  Kita Reece had genetic testing performed on 01/09/24 because of a family history of cancer.     Genetic Testing Result:  No known pathogenic variants were found in the following 19 genes: FE, BARD1, BRCA1, BRCA2, BRIP1, CDH1, CHEK2, MLH1, MSH2 (including EPCAM rearrangement testing), MSH6, NF1, PALB2, PMS2, PTEN, RAD51C, RAD51D, STK11, and TP53.  Please refer to the report from [facility] ([id number]) for additional testing information. These results were discussed with Ms. Reece by phone on 01/22/24.    Summary and Plan:  These results indicate that it is unlikely that Ms. Reece has a pathogenic variant in any of the genes listed above. The limitations of the testing include the chance that a pathogenic variant in a gene other than those included in this analysis might be the cause of cancer in Ms. Reece's relatives. I encourage Ms. Reece to contact me on an annual basis to see if there have been any updates in genetic testing that would apply to her or to inform me if there are any changes to the family history.    In the meantime, Ms. Reece and her relatives should speak with their physicians to discuss recommended medical management according to their personal and family history.    Cc:  Kita Reece

## 2024-03-05 ENCOUNTER — HOSPITAL ENCOUNTER (OUTPATIENT)
Dept: ULTRASOUND IMAGING | Age: 44
Discharge: HOME | End: 2024-03-05
Attending: FAMILY MEDICINE
Payer: COMMERCIAL

## 2024-03-05 DIAGNOSIS — R92.333 HETEROGENEOUSLY DENSE TISSUE OF BOTH BREASTS ON MAMMOGRAPHY: ICD-10-CM

## 2024-03-05 PROCEDURE — 76641 ULTRASOUND BREAST COMPLETE: CPT | Performed by: FAMILY MEDICINE

## 2024-03-23 ENCOUNTER — TELEPHONE (OUTPATIENT)
Dept: FAMILY MEDICINE CLINIC | Facility: CLINIC | Age: 44
End: 2024-03-23

## 2024-03-23 NOTE — TELEPHONE ENCOUNTER
fluticasone propionate (FLOVENT HFA) 110 MCG/ACT Inhalation Aerosol, Inhale 2 puffs into the lungs 2 (two) times daily., Disp: 1 each, Rfl: 3    Message: Plan does not cover this medication prescribed. Per RX benefit plan alternative medications include. ARNUITY, ASMANEX, or QVAR. Please fax back approval along with strength, directions, quantity, and refills.

## 2024-03-25 NOTE — TELEPHONE ENCOUNTER
Dr. Weiler, please see message from pharmacy and advise on medication change. Thanks.    No future appointments.

## 2024-04-07 ENCOUNTER — OFFICE VISIT (OUTPATIENT)
Dept: FAMILY MEDICINE CLINIC | Facility: CLINIC | Age: 44
End: 2024-04-07
Payer: COMMERCIAL

## 2024-04-07 VITALS
WEIGHT: 200 LBS | BODY MASS INDEX: 33.32 KG/M2 | TEMPERATURE: 99 F | SYSTOLIC BLOOD PRESSURE: 116 MMHG | DIASTOLIC BLOOD PRESSURE: 72 MMHG | RESPIRATION RATE: 16 BRPM | OXYGEN SATURATION: 98 % | HEIGHT: 65 IN | HEART RATE: 93 BPM

## 2024-04-07 DIAGNOSIS — J11.1 INFLUENZA-LIKE ILLNESS: Primary | ICD-10-CM

## 2024-04-07 PROCEDURE — 3074F SYST BP LT 130 MM HG: CPT | Performed by: NURSE PRACTITIONER

## 2024-04-07 PROCEDURE — 3008F BODY MASS INDEX DOCD: CPT | Performed by: NURSE PRACTITIONER

## 2024-04-07 PROCEDURE — 3078F DIAST BP <80 MM HG: CPT | Performed by: NURSE PRACTITIONER

## 2024-04-07 PROCEDURE — 99213 OFFICE O/P EST LOW 20 MIN: CPT | Performed by: NURSE PRACTITIONER

## 2024-04-07 NOTE — PROGRESS NOTES
CHIEF COMPLAINT:     Chief Complaint   Patient presents with    Sinus Problem     Started on , coughing, congestion       HPI:   Kita Reece is a 43 year old female who presents for upper respiratory symptoms for  4 days. Patient reports congestion, dry cough, feverish, runny nose, sinus headache, body aches . Symptoms have been up and down since onset.  Treating symptoms with otc medication.      Current Outpatient Medications   Medication Sig Dispense Refill    Mometasone Furoate 100 MCG/ACT Inhalation Aerosol Inhale 2 puffs into the lungs in the morning and 2 puffs before bedtime. 1 each 1    fluticasone propionate (FLOVENT HFA) 110 MCG/ACT Inhalation Aerosol Inhale 2 puffs into the lungs 2 (two) times daily. 1 each 3    VENTOLIN  (90 Base) MCG/ACT Inhalation Aero Soln Inhale 1 puff into the lungs every 6 (six) hours as needed for Wheezing. 18 g 3    ALBUTEROL SULFATE  (90 Base) MCG/ACT Inhalation Aero Soln INHALE 1 PUFF INTO THE LUNGS EVERY 6 HOURS AS NEEDED FOR WHEEZING 8.5 g 0      Past Medical History:   Diagnosis Date    Asthma (HCC)     Lipid screening 2007    per:NG      Past Surgical History:   Procedure Laterality Date               Social History     Socioeconomic History    Marital status:    Tobacco Use    Smoking status: Never    Smokeless tobacco: Never   Vaping Use    Vaping Use: Never used   Substance and Sexual Activity    Alcohol use: Yes     Comment: socially    Drug use: No   Other Topics Concern    Caffeine Concern Yes     Comment: coffee-1 cup         REVIEW OF SYSTEMS:   GENERAL: normal appetite  SKIN: no rashes or abnormal skin lesions  HEENT: See HPI  LUNGS: See HPI  CARDIOVASCULAR: denies chest pain or palpitations   GI: denies N/V/C or abdominal pain      EXAM:   /72   Pulse 93   Temp 99.4 °F (37.4 °C)   Resp 16   Ht 5' 5\" (1.651 m)   Wt 200 lb (90.7 kg)   LMP 2024 (Approximate)   SpO2 98%   BMI 33.28 kg/m²    GENERAL: well developed, well nourished,in no apparent distress  SKIN: no rashes,no suspicious lesions  HEAD: atraumatic, normocephalic.  pos tenderness on palpation of  sinuses  EYES: conjunctiva clear, EOM intact  EARS: TM's pearly, no bulging,  no retraction,no fluid, bony landmarks visible  NOSE: Nostrils patent, clear nasal discharge, nasal mucosa red and inflamed   THROAT: Oral mucosa pink, moist. Posterior pharynx is not erythematous. no exudates. Tonsils 1/4.    NECK: Supple, non-tender  LUNGS: clear to auscultation bilaterally, no wheezes or rhonchi. Breathing is non labored.  CARDIO: RRR without murmur  EXTREMITIES: no cyanosis, clubbing or edema  LYMPH:  no lymphadenopathy.        ASSESSMENT AND PLAN:   Kita Reece is a 43 year old female who presents with upper respiratory symptoms that are consistent with    ASSESSMENT:   Encounter Diagnosis   Name Primary?    Influenza-like illness Yes       PLAN: Meds as below.  Comfort care as described in Patient Instructions  Educated on viral vs bacterial  Educated on supportive measures: ibuprofen/tylenol, hydration, zyrtec, flonase, delsym for cough if needed  Educated on s/s of worsening sx and when to seek higher level of care  Follow up with pcp if not improving      Meds & Refills for this Visit:  Requested Prescriptions      No prescriptions requested or ordered in this encounter     Risks, benefits, and side effects of medication explained and discussed.    The patient indicates understanding of these issues and agrees to the plan.  The patient is asked to f/u with PCP if sx's persist or worsen.  There are no Patient Instructions on file for this visit.

## 2024-04-08 ENCOUNTER — NURSE TRIAGE (OUTPATIENT)
Dept: FAMILY MEDICINE CLINIC | Facility: CLINIC | Age: 44
End: 2024-04-08

## 2024-04-08 NOTE — TELEPHONE ENCOUNTER
Patient was seen yesterday and requesting medication. [facility] schedule patient appt tomorrow and seeking advice today.       Patient feels very congested in the head.   Symptoms started Thursday with scratchy throat. And gradually progressed to congestion, headaches, chills.     Shw was seen at [facility] yesterday.   Has been taking mucinex. Flonase, zyrtec.   No cough.   Today feels worse. Has sinus head congestion.     Wheezing, uses albuterol PRN.     No GI symptoms.   Patient to continue monitoring symptoms. REST, steam showers, use humidifier. Take OTC supportive medications for symptom relief.     If any significant symptoms such as sob, chest tightness, asthma complications, go to ER. Patient verbalized understanding.    Reason for Disposition   Patient wants to be seen    Protocols used: Sinus Pain and Congestion-A-[place]

## 2024-04-09 ENCOUNTER — TELEMEDICINE (OUTPATIENT)
Dept: FAMILY MEDICINE CLINIC | Facility: CLINIC | Age: 44
End: 2024-04-09
Payer: COMMERCIAL

## 2024-04-09 DIAGNOSIS — R05.9 COUGH, UNSPECIFIED TYPE: Primary | ICD-10-CM

## 2024-04-09 PROCEDURE — 99213 OFFICE O/P EST LOW 20 MIN: CPT | Performed by: FAMILY MEDICINE

## 2024-04-09 NOTE — PROGRESS NOTES
HPI: Kita is a 43 year old female who presents for cough and congestion Started last Thursday with cough and post nasal drip. Got worse on Sunday.  Had low grade fever. Told it was likely the flu.  Conservative management.  Woke up yesterday and felt like head was going to pop off.  Felt very congested and could not get out of bed.  Woke up feeling better today. No fever. Feels overheated with activity.  Had body aches and chills which have resolved. Productive cough.  Has some shortness of breath. Did not test. Sinus pressure is improved with Ibuprofen.     PMH:    Past Medical History:   Diagnosis Date    Asthma ([facility])     Lipid screening 4/7/2007    per:NG      Alg:  Shellfish, Latex, and Seasonal   Meds:   Current Outpatient Medications on File Prior to Visit   Medication Sig Dispense Refill    Mometasone Furoate 100 MCG/ACT Inhalation Aerosol Inhale 2 puffs into the lungs in the morning and 2 puffs before bedtime. 1 each 1    fluticasone propionate (FLOVENT HFA) 110 MCG/ACT Inhalation Aerosol Inhale 2 puffs into the lungs 2 (two) times daily. 1 each 3    VENTOLIN  (90 Base) MCG/ACT Inhalation Aero Soln Inhale 1 puff into the lungs every 6 (six) hours as needed for Wheezing. 18 g 3    ALBUTEROL SULFATE  (90 Base) MCG/ACT Inhalation Aero Soln INHALE 1 PUFF INTO THE LUNGS EVERY 6 HOURS AS NEEDED FOR WHEEZING 8.5 g 0     No current facility-administered medications on file prior to visit.      Tobacco Use: no    Objective:   Gen: AOx3. NAD.   LMP 03/16/2024 (Approximate)       Assessment:/Plan:  Encounter Diagnosis   Name Primary?    Cough, unspecified type Yes       Feels better today.  Will continue symptomatic management and let me know if symptoms get worse. Pt agreed with plan      Please note that the following visit was completed using two-way, real-time interactive audio and video communication.  This has been done in good funmilayo to provide continuity of care in the best interest of the  provider-patient relationship, due to the ongoing public health crisis/national emergency and because of restrictions of visitation.  There are limitations of this visit as no physical exam could be performed.  Every conscious effort was taken to allow for sufficient and adequate time.  This billing was spent on reviewing labs, medications, radiology tests and decision making.  Appropriate medical decision-making and tests are ordered as detailed in the plan of care above.     Colleen Weiler, DO

## 2024-10-28 ENCOUNTER — LAB ENCOUNTER (OUTPATIENT)
Dept: LAB | Age: 44
End: 2024-10-28
Attending: FAMILY MEDICINE
Payer: COMMERCIAL

## 2024-10-28 ENCOUNTER — OFFICE VISIT (OUTPATIENT)
Dept: FAMILY MEDICINE CLINIC | Facility: CLINIC | Age: 44
End: 2024-10-28
Payer: COMMERCIAL

## 2024-10-28 VITALS
HEART RATE: 69 BPM | SYSTOLIC BLOOD PRESSURE: 124 MMHG | BODY MASS INDEX: 35.06 KG/M2 | DIASTOLIC BLOOD PRESSURE: 81 MMHG | TEMPERATURE: 98 F | RESPIRATION RATE: 16 BRPM | WEIGHT: 213 LBS | HEIGHT: 65.35 IN

## 2024-10-28 DIAGNOSIS — L30.0 NUMMULAR ECZEMA: ICD-10-CM

## 2024-10-28 DIAGNOSIS — Z00.00 ROUTINE PHYSICAL EXAMINATION: Primary | ICD-10-CM

## 2024-10-28 DIAGNOSIS — N95.1 PERI-MENOPAUSE: ICD-10-CM

## 2024-10-28 DIAGNOSIS — Z00.00 ROUTINE PHYSICAL EXAMINATION: ICD-10-CM

## 2024-10-28 DIAGNOSIS — Z12.31 ENCOUNTER FOR SCREENING MAMMOGRAM FOR BREAST CANCER: ICD-10-CM

## 2024-10-28 LAB
ALBUMIN SERPL-MCNC: 4.1 G/DL (ref 3.2–4.8)
ALBUMIN/GLOB SERPL: 1.2 {RATIO} (ref 1–2)
ALP LIVER SERPL-CCNC: 46 U/L
ALT SERPL-CCNC: 20 U/L
ANION GAP SERPL CALC-SCNC: 6 MMOL/L (ref 0–18)
AST SERPL-CCNC: 22 U/L (ref ?–34)
BILIRUB SERPL-MCNC: 0.6 MG/DL (ref 0.3–1.2)
BUN BLD-MCNC: 13 MG/DL (ref 9–23)
BUN/CREAT SERPL: 13.1 (ref 10–20)
CALCIUM BLD-MCNC: 9.1 MG/DL (ref 8.7–10.4)
CHLORIDE SERPL-SCNC: 107 MMOL/L (ref 98–112)
CHOLEST SERPL-MCNC: 157 MG/DL (ref ?–200)
CO2 SERPL-SCNC: 26 MMOL/L (ref 21–32)
CREAT BLD-MCNC: 0.99 MG/DL
DEPRECATED RDW RBC AUTO: 49.3 FL (ref 35.1–46.3)
EGFRCR SERPLBLD CKD-EPI 2021: 72 ML/MIN/1.73M2 (ref 60–?)
ERYTHROCYTE [DISTWIDTH] IN BLOOD BY AUTOMATED COUNT: 14.6 % (ref 11–15)
EST. AVERAGE GLUCOSE BLD GHB EST-MCNC: 123 MG/DL (ref 68–126)
FASTING PATIENT LIPID ANSWER: NO
FASTING STATUS PATIENT QL REPORTED: NO
GLOBULIN PLAS-MCNC: 3.3 G/DL (ref 2–3.5)
GLUCOSE BLD-MCNC: 75 MG/DL (ref 70–99)
HBA1C MFR BLD: 5.9 % (ref ?–5.7)
HCT VFR BLD AUTO: 37.9 %
HDLC SERPL-MCNC: 67 MG/DL (ref 40–59)
HGB BLD-MCNC: 12.2 G/DL
LDLC SERPL CALC-MCNC: 79 MG/DL (ref ?–100)
MCH RBC QN AUTO: 29.6 PG (ref 26–34)
MCHC RBC AUTO-ENTMCNC: 32.2 G/DL (ref 31–37)
MCV RBC AUTO: 92 FL
NONHDLC SERPL-MCNC: 90 MG/DL (ref ?–130)
OSMOLALITY SERPL CALC.SUM OF ELEC: 287 MOSM/KG (ref 275–295)
PLATELET # BLD AUTO: 349 10(3)UL (ref 150–450)
POTASSIUM SERPL-SCNC: 4 MMOL/L (ref 3.5–5.1)
PROT SERPL-MCNC: 7.4 G/DL (ref 5.7–8.2)
RBC # BLD AUTO: 4.12 X10(6)UL
SODIUM SERPL-SCNC: 139 MMOL/L (ref 136–145)
TRIGL SERPL-MCNC: 52 MG/DL (ref 30–149)
TSI SER-ACNC: 1.54 MIU/ML (ref 0.55–4.78)
VLDLC SERPL CALC-MCNC: 8 MG/DL (ref 0–30)
WBC # BLD AUTO: 7.6 X10(3) UL (ref 4–11)

## 2024-10-28 PROCEDURE — 85027 COMPLETE CBC AUTOMATED: CPT

## 2024-10-28 PROCEDURE — 80061 LIPID PANEL: CPT

## 2024-10-28 PROCEDURE — 80053 COMPREHEN METABOLIC PANEL: CPT

## 2024-10-28 PROCEDURE — 84443 ASSAY THYROID STIM HORMONE: CPT

## 2024-10-28 PROCEDURE — 36415 COLL VENOUS BLD VENIPUNCTURE: CPT

## 2024-10-28 PROCEDURE — 83036 HEMOGLOBIN GLYCOSYLATED A1C: CPT

## 2024-10-28 RX ORDER — ALBUTEROL SULFATE 90 UG/1
INHALANT RESPIRATORY (INHALATION)
Qty: 8.5 G | Refills: 0 | Status: SHIPPED | OUTPATIENT
Start: 2024-10-28

## 2024-10-28 RX ORDER — MULTIVIT-MIN/IRON/FOLIC ACID/K 18-600-40
CAPSULE ORAL
COMMUNITY

## 2024-10-28 RX ORDER — HYDROCORTISONE 25 MG/G
1 CREAM TOPICAL 2 TIMES DAILY
Qty: 30 G | Refills: 1 | Status: SHIPPED | OUTPATIENT
Start: 2024-10-28

## 2024-10-28 NOTE — PROGRESS NOTES
HPI:  44 yr old female who presents for physical.  with 2 boys- 6th and 4th.     Has rash on arms.  Circular patches.  Tried cortisone and lotrimin. Moisturizing.     Mother went into menopause early. Struggling with brain fog.  Taking longer to process and complete tasks. Just purchased [employer]'s Srinath. Has had weight gain as well. Has gained 10 pounds without change in lifestyle. Denies hot flashes.  Has bouts of insomnia. Gets light periods.  Has skipped a month occasionally in the past year. No history of blood clots. Non-smoker. No family or personal history of breast cancer. Had negative genetic testing for breast cancer.  had vasectomy. Last pap was 2021. HPV negative.     Asthma is controlled. Has rescue inhaler and mostly controlled. Has a few episodes per year where she has to use inhaler more. Has to use it every 4 hours for several days.     PMHx: reviewed, see chart  PSHx: reviewed, see chart  All: Shellfish, Latex, and Seasonal   Meds: see chart    ROS:   Allergic/Immuno:  Negative for environmental allergies and food allergies  Cardiovascular:  Negative for chest pain and irregular heartbeat/palpitations  Constitutional:  Negative for decreased activity, fever, irritability and lethargy  Endocrine:  Negative for abnormal sleep patterns, increased activity, polydipsia and polyphagia  ENMT:  Negative for ear drainage, hearing loss and nasal drainage  Eyes:  Negative for eye discharge and vision loss  Gastrointestinal:  Negative for abdominal pain, constipation, decreased appetite, diarrhea and vomiting  Genitourinary:  Negative for dysuria and hematuria  Hema/Lymph:  Negative for easy bleeding and easy bruising  Integumentary:  Negative for pruritus and rash  Musculoskeletal:  Negative for bone/joint symptoms  Neurological: Positive for brain fog  Psychiatric:  Negative for inappropriate interaction and psychiatric symptoms    PE:  /81   Pulse 69   Temp 97.9 °F (36.6 °C) (Temporal)    Resp 16   Ht 5' 5.35\" (1.66 m)   Wt 213 lb (96.6 kg)   LMP 03/16/2024 (Approximate)   BMI 35.06 kg/m²   Gen:  Well-nourished.  No distress.  HEENT: Conjunctive clear.  Donte ear canals clear.  Donte TMs intact with good landmarks noted.  Nares patent.  Oral mucous membrane moist.  Normal lips, teeth, and gums.  Oropharynx normal.  Neck supple.  Good ROM.  No LAD.  Thyroid normal.  CV:  Regular rate and rhythm; no murmurs  Lungs:  Clear to ausculation; good aeration               No wheezes, rales or rhonchi  Abd: soft, non-tender, non-distended          Normal bowel sounds; no masses          No hepatosplenomegaly  Breasts:  Normal appearance bilateral.  No masses or lesions noted.  Normal nipples bilateral.  No nipple discharge noted.  Normal lymph nodes.  Extremities: No cyanosis, clubbing, edema.  Pedal pulses 2+ donte.  MSK:  No abnormalities.  Skin: Warm/dry/intact.  No abnormal moles/lesions noted.  No rashes.    A/P:  Encounter Diagnoses   Name Primary?    Routine physical examination    Encouraged exercise and healthy diet.  Labs done. Flu shot given.    Yes    Nummular eczema    Rash seems to be due to eczema.  Advised daily moisturizing.  Hydrocortisone BID as needed.        Encounter for screening mammogram for breast cancer     Mammogram ordered.  Will call with results.       Silvina-menopause    Discussed various symptoms at length.  Reviewed HRT and possible side effects.  Reviewed contraindications.  Will get labs.  Pt has had normal mammogram and will get annual in December. Will start Estradiol 0.025mcg twice weekly and Progesterone 100mg nightly.  F/U in 2 months.  Pt agreed with plan.         Colleen Weiler, DO

## 2024-10-30 ENCOUNTER — TELEPHONE (OUTPATIENT)
Dept: FAMILY MEDICINE CLINIC | Facility: CLINIC | Age: 44
End: 2024-10-30

## 2024-10-30 NOTE — TELEPHONE ENCOUNTER
Dr.Weiler I received a call from patient.She was in to see you on 10/28/24.You were going to sent in a low dose of HRT patch for her. Will you still be sending this to her pharmacy? Patient did get her labs done. Please advise

## 2024-10-31 RX ORDER — ESTRADIOL 0.03 MG/D
1 FILM, EXTENDED RELEASE TRANSDERMAL
Qty: 24 PATCH | Refills: 0 | Status: SHIPPED | OUTPATIENT
Start: 2024-10-31 | End: 2025-01-29

## 2024-10-31 RX ORDER — PROGESTERONE 100 MG/1
100 CAPSULE ORAL NIGHTLY
Qty: 90 CAPSULE | Refills: 0 | Status: SHIPPED | OUTPATIENT
Start: 2024-10-31

## 2024-10-31 NOTE — TELEPHONE ENCOUNTER
Patient calling back, verified name and date of birth. Reviewed Dr Weiler's recommendation from her 10/31/24 note below.  Patient verbalizes understanding and  follow up appointment scheduled:  Future Appointments   Date Time Provider Department Center   11/4/2024  3:00 PM Weiler, Colleen M,  [facility]   12/30/2024  1:30 PM Weiler, Colleen M,  [facility]     .

## 2024-11-04 ENCOUNTER — TELEMEDICINE (OUTPATIENT)
Dept: FAMILY MEDICINE CLINIC | Facility: CLINIC | Age: 44
End: 2024-11-04
Payer: COMMERCIAL

## 2024-11-04 DIAGNOSIS — R73.02 IMPAIRED GLUCOSE TOLERANCE: Primary | ICD-10-CM

## 2024-11-04 NOTE — PROGRESS NOTES
HPI: Kita is a 44 year old female who presents for lab results. Had elevated hemoglobin A1C.  Both her parents have diabetes. Feels like she could lose weight. Would like to make changes and avoid medication.     PMH:    Past Medical History:    Asthma (HCC)    Lipid screening    per:NG      Alg:  Shellfish, Latex, and Seasonal   Meds: Medications Ordered Prior to Encounter[1]   Tobacco Use: no    ROS: see HPI    Objective:   Gen: AOx3. NAD.   LMP 03/16/2024 (Approximate)       Assessment:/Plan:  Encounter Diagnosis   Name Primary?    Impaired glucose tolerance Yes       Hemoglobin A1C elevated.  Parents are both diabetic.  Discussed lifestyle changes with patient.  [facility] referral placed.  Pt will call and try to schedule.  If unable to participate, schedule appt with dietician.  Follow-up 6 months and plan to recheck hemoglobin A1C in the office.       Please note that the following visit was completed using two-way, real-time interactive audio and video communication.  This has been done in good funmilayo to provide continuity of care in the best interest of the provider-patient relationship, due to the ongoing public health crisis/national emergency and because of restrictions of visitation.  There are limitations of this visit as no physical exam could be performed.  Every conscious effort was taken to allow for sufficient and adequate time.  This billing was spent on reviewing labs, medications, radiology tests and decision making.  Appropriate medical decision-making and tests are ordered as detailed in the plan of care above.     Colleen Weiler, DO                  [1]   Current Outpatient Medications on File Prior to Visit   Medication Sig Dispense Refill    estradiol (VIVELLE-DOT) 0.025 MG/24HR Transdermal Patch Biweekly Place 1 patch onto the skin twice a week. 24 patch 0    progesterone 100 MG Oral Cap Take 1 capsule (100 mg total) by mouth nightly. 90 capsule 0    Cholecalciferol (VITAMIN D) 50 MCG  (2000 UT) Oral Cap Take by mouth.      NON FORMULARY Lion's emelyn supplement      hydrocortisone 2.5 % External Cream Apply 1 Application topically 2 (two) times daily. 30 g 1    albuterol 108 (90 Base) MCG/ACT Inhalation Aero Soln INHALE 1 PUFF INTO THE LUNGS EVERY 6 HOURS AS NEEDED FOR WHEEZING 8.5 g 0     No current facility-administered medications on file prior to visit.

## 2024-12-21 ENCOUNTER — HOSPITAL ENCOUNTER (OUTPATIENT)
Dept: MAMMOGRAPHY | Age: 44
Discharge: HOME | End: 2024-12-21
Attending: FAMILY MEDICINE
Payer: COMMERCIAL

## 2024-12-21 ENCOUNTER — HOSPITAL ENCOUNTER (OUTPATIENT)
Dept: MAMMOGRAPHY | Age: 44
End: 2024-12-21
Attending: FAMILY MEDICINE
Payer: COMMERCIAL

## 2024-12-21 DIAGNOSIS — Z12.31 ENCOUNTER FOR SCREENING MAMMOGRAM FOR BREAST CANCER: ICD-10-CM

## 2024-12-21 PROCEDURE — 77063 BREAST TOMOSYNTHESIS BI: CPT | Performed by: FAMILY MEDICINE

## 2024-12-21 PROCEDURE — 77067 SCR MAMMO BI INCL CAD: CPT | Performed by: FAMILY MEDICINE

## 2025-01-20 ENCOUNTER — OFFICE VISIT (OUTPATIENT)
Dept: FAMILY MEDICINE CLINIC | Facility: CLINIC | Age: 45
End: 2025-01-20
Payer: COMMERCIAL

## 2025-01-20 VITALS
HEART RATE: 74 BPM | OXYGEN SATURATION: 98 % | WEIGHT: 208 LBS | RESPIRATION RATE: 16 BRPM | TEMPERATURE: 98 F | BODY MASS INDEX: 34 KG/M2 | DIASTOLIC BLOOD PRESSURE: 81 MMHG | SYSTOLIC BLOOD PRESSURE: 130 MMHG

## 2025-01-20 DIAGNOSIS — N95.1 PERI-MENOPAUSE: Primary | ICD-10-CM

## 2025-01-20 DIAGNOSIS — R73.02 IMPAIRED GLUCOSE TOLERANCE: ICD-10-CM

## 2025-01-20 PROCEDURE — 3079F DIAST BP 80-89 MM HG: CPT | Performed by: FAMILY MEDICINE

## 2025-01-20 PROCEDURE — 99213 OFFICE O/P EST LOW 20 MIN: CPT | Performed by: FAMILY MEDICINE

## 2025-01-20 PROCEDURE — 3075F SYST BP GE 130 - 139MM HG: CPT | Performed by: FAMILY MEDICINE

## 2025-01-20 PROCEDURE — G2211 COMPLEX E/M VISIT ADD ON: HCPCS | Performed by: FAMILY MEDICINE

## 2025-01-20 RX ORDER — ALBUTEROL SULFATE 90 UG/1
INHALANT RESPIRATORY (INHALATION)
Qty: 8.5 G | Refills: 0 | Status: SHIPPED | OUTPATIENT
Start: 2025-01-20

## 2025-01-20 RX ORDER — PROGESTERONE 100 MG/1
100 CAPSULE ORAL NIGHTLY
Qty: 90 CAPSULE | Refills: 1 | Status: SHIPPED | OUTPATIENT
Start: 2025-01-20

## 2025-01-20 RX ORDER — ESTRADIOL 0.03 MG/D
1 FILM, EXTENDED RELEASE TRANSDERMAL
Qty: 24 PATCH | Refills: 1 | Status: SHIPPED | OUTPATIENT
Start: 2025-01-20 | End: 2025-04-20

## 2025-01-20 NOTE — PROGRESS NOTES
HPI: Kita is a 44 year old female who presents for HRT follow-up. Feels like HRT is helping. Feels like she has more energy and less brain fog. Feels better and more like herself. Sleeping better. Feels good on the dose she is on. She does feel itchy all the time. Periods have not changed.  Normal. Monthly. Has had skipped periods. Lost 5 pounds.  Joined a gym and exercises 3-4 times per week. Modified eating habits.   Uses Hydrocortisone which helps.     PMH:    Past Medical History:    Asthma (HCC)    Lipid screening    per:[facility]      Alg:  Shellfish, Latex, and Seasonal   Meds: Medications Ordered Prior to Encounter[1]   Tobacco Use: no    ROS: see HPI    Objective:   Gen: AOx3. NAD.   /81 (BP Location: Left arm, Patient Position: Sitting, Cuff Size: adult)   Pulse 74   Temp 97.8 °F (36.6 °C) (Temporal)   Resp 16   Wt 208 lb (94.3 kg)   LMP 01/07/2025 (Approximate)   SpO2 98%   BMI 34.24 kg/m²   CV:  Regular rate and rhythm; no murmurs  Lungs:  Clear to ausculation; good aeration               No wheezes, rales or rhonchi        Assessment:/Plan:  Encounter Diagnosis   Name Primary?    Silvina-menopause Yes       HRT seems to be helping.  Will continue at current dose and refill.  Has dry skin.Continue to moisturize and use Hydrocortisone as needed.  If no improvement, will plan for referral to Dermatology.     Impaired Glucose Tolerance    Congratulated on 5 pounds weight loss! Exercising regularly. Will recheck A1C in April.     Colleen Weiler, DO           [1]   Current Outpatient Medications on File Prior to Visit   Medication Sig Dispense Refill    estradiol (VIVELLE-DOT) 0.025 MG/24HR Transdermal Patch Biweekly Place 1 patch onto the skin twice a week. 24 patch 0    progesterone 100 MG Oral Cap Take 1 capsule (100 mg total) by mouth nightly. 90 capsule 0    Cholecalciferol (VITAMIN D) 50 MCG (2000 UT) Oral Cap Take by mouth.      NON FORMULARY Lion's emelyn supplement      hydrocortisone 2.5 %  External Cream Apply 1 Application topically 2 (two) times daily. 30 g 1    albuterol 108 (90 Base) MCG/ACT Inhalation Aero Soln INHALE 1 PUFF INTO THE LUNGS EVERY 6 HOURS AS NEEDED FOR WHEEZING 8.5 g 0     No current facility-administered medications on file prior to visit.

## 2025-01-24 RX ORDER — ALBUTEROL SULFATE 90 UG/1
INHALANT RESPIRATORY (INHALATION)
Qty: 8.5 G | Refills: 0 | OUTPATIENT
Start: 2025-01-24

## 2025-03-22 RX ORDER — ESTRADIOL 0.03 MG/D
1 FILM, EXTENDED RELEASE TRANSDERMAL
Qty: 24 PATCH | Refills: 1 | Status: CANCELLED | OUTPATIENT
Start: 2025-03-24 | End: 2025-06-22

## 2025-03-27 RX ORDER — ALBUTEROL SULFATE 90 UG/1
INHALANT RESPIRATORY (INHALATION)
Qty: 8.5 G | Refills: 0 | OUTPATIENT
Start: 2025-03-27

## 2025-03-27 RX ORDER — ALBUTEROL SULFATE 90 UG/1
INHALANT RESPIRATORY (INHALATION)
Qty: 8.5 G | Refills: 3 | Status: SHIPPED | OUTPATIENT
Start: 2025-03-27

## 2025-05-20 RX ORDER — ALBUTEROL SULFATE 90 UG/1
1 AEROSOL, METERED RESPIRATORY (INHALATION) EVERY 6 HOURS PRN
Qty: 18 G | Refills: 1 | Status: SHIPPED | OUTPATIENT
Start: 2025-05-20

## 2025-07-01 RX ORDER — HYDROCORTISONE 25 MG/G
1 CREAM TOPICAL 2 TIMES DAILY
Qty: 30 G | Refills: 1 | Status: SHIPPED | OUTPATIENT
Start: 2025-07-01

## (undated) NOTE — LETTER
AUTHORIZATION FOR SURGICAL OPERATION OR OTHER PROCEDURE    1.  I [facility] Chelsea Shrape, and [facility] assigned to my case to perform the following operation and/or procedure at the [facility]:    _________________________ Patient Name:  ______________________________________________________  (please print)      Patient signature:  ___________________________________________________             Relationship to Patient:           []  Parent    Responsible person

## (undated) NOTE — LETTER
AUTHORIZATION FOR SURGICAL OPERATION OR OTHER PROCEDURE    1.  I hereby authorize Dr. Terrace Dancer, and [facility] staff assigned to my case to perform the following operation and/or procedure at the [facility], [facility]:    __________________________ Time:  ________ A. M.  P.M.        Patient Name:  ______________________________________________________  (please print)      Patient signature:  ___________________________________________________             Relationship to Patient:

## (undated) NOTE — Clinical Note
Thank you for this referral! I recommend annual screening ultrasound in addition to annual screening mammogram. Doesn't qualify for MRI at this time, but things can change with family history, etc. I'm happy to see her PRN. Thanks, Nam Oates

## (undated) NOTE — MR AVS SNAPSHOT
After Visit Summary   8/6/2021    Maliha Dyson    MRN: [id number]           Visit Information     Date & Time  8/6/2021  2:15 PM Provider  Oleg Alvarez, [address] [place] [address], [facility] Dept.  Phone  [phone] Parking)        [address]          It is the patient's responsibility to check with and follow their insurance company's guidelines for prior authorization for this test.  You may be held responsible for payment in full if prope VISITS  Average cost  $35*    e-VISTS  Average cost  $35*     SAME DAY APPOINTMENTS   Available at primary care offices    AFTER HOURS CARE  [place]  OFFICE VISIT   Primary Care Providers  Treatment for mild illness or injury that does not require immediat

## (undated) NOTE — LETTER
11/11/2019              Maliha Dyson        [address] [place] [address]         Dear Flaquito Crook,     Your TB test that was placed on 11/8/19 and read today 11/11/19 was Negative. There is no need for further testing at this time.